# Patient Record
Sex: FEMALE | Race: BLACK OR AFRICAN AMERICAN | NOT HISPANIC OR LATINO | URBAN - METROPOLITAN AREA
[De-identification: names, ages, dates, MRNs, and addresses within clinical notes are randomized per-mention and may not be internally consistent; named-entity substitution may affect disease eponyms.]

---

## 2019-09-23 ENCOUNTER — INPATIENT (INPATIENT)
Facility: HOSPITAL | Age: 69
LOS: 16 days | Discharge: NURSING, REHAB OR LONG-TERM CARE | End: 2019-10-10
Attending: HOSPITALIST | Admitting: HOSPITALIST
Payer: MEDICARE

## 2019-09-23 VITALS
HEIGHT: 64 IN | TEMPERATURE: 98 F | SYSTOLIC BLOOD PRESSURE: 125 MMHG | RESPIRATION RATE: 16 BRPM | OXYGEN SATURATION: 97 % | WEIGHT: 160.06 LBS | DIASTOLIC BLOOD PRESSURE: 62 MMHG | HEART RATE: 96 BPM

## 2019-09-23 LAB
ALBUMIN SERPL ELPH-MCNC: 3.4 G/DL — SIGNIFICANT CHANGE UP (ref 3.3–5)
ALP SERPL-CCNC: 104 U/L — SIGNIFICANT CHANGE UP (ref 40–120)
ALT FLD-CCNC: 43 U/L — SIGNIFICANT CHANGE UP (ref 12–78)
ANION GAP SERPL CALC-SCNC: 10 MMOL/L — SIGNIFICANT CHANGE UP (ref 5–17)
ANISOCYTOSIS BLD QL: SLIGHT — SIGNIFICANT CHANGE UP
APTT BLD: 42.3 SEC — HIGH (ref 27.5–36.3)
AST SERPL-CCNC: 33 U/L — SIGNIFICANT CHANGE UP (ref 15–37)
BASOPHILS # BLD AUTO: 0.02 K/UL — SIGNIFICANT CHANGE UP (ref 0–0.2)
BASOPHILS NFR BLD AUTO: 0.1 % — SIGNIFICANT CHANGE UP (ref 0–2)
BILIRUB SERPL-MCNC: 0.4 MG/DL — SIGNIFICANT CHANGE UP (ref 0.2–1.2)
BLD GP AB SCN SERPL QL: SIGNIFICANT CHANGE UP
BUN SERPL-MCNC: 12 MG/DL — SIGNIFICANT CHANGE UP (ref 7–23)
CALCIUM SERPL-MCNC: 9.6 MG/DL — SIGNIFICANT CHANGE UP (ref 8.5–10.1)
CHLORIDE SERPL-SCNC: 98 MMOL/L — SIGNIFICANT CHANGE UP (ref 96–108)
CO2 SERPL-SCNC: 29 MMOL/L — SIGNIFICANT CHANGE UP (ref 22–31)
CREAT SERPL-MCNC: 0.84 MG/DL — SIGNIFICANT CHANGE UP (ref 0.5–1.3)
EOSINOPHIL # BLD AUTO: 0 K/UL — SIGNIFICANT CHANGE UP (ref 0–0.5)
EOSINOPHIL NFR BLD AUTO: 0 % — SIGNIFICANT CHANGE UP (ref 0–6)
GLUCOSE SERPL-MCNC: 133 MG/DL — HIGH (ref 70–99)
HCT VFR BLD CALC: 32.7 % — LOW (ref 34.5–45)
HGB BLD-MCNC: 9.7 G/DL — LOW (ref 11.5–15.5)
HYPOCHROMIA BLD QL: SLIGHT — SIGNIFICANT CHANGE UP
IMM GRANULOCYTES NFR BLD AUTO: 0.5 % — SIGNIFICANT CHANGE UP (ref 0–1.5)
INR BLD: 2.94 RATIO — HIGH (ref 0.88–1.16)
LYMPHOCYTES # BLD AUTO: 0.73 K/UL — LOW (ref 1–3.3)
LYMPHOCYTES # BLD AUTO: 5.2 % — LOW (ref 13–44)
MACROCYTES BLD QL: SLIGHT — SIGNIFICANT CHANGE UP
MANUAL SMEAR VERIFICATION: SIGNIFICANT CHANGE UP
MCHC RBC-ENTMCNC: 22.1 PG — LOW (ref 27–34)
MCHC RBC-ENTMCNC: 29.7 GM/DL — LOW (ref 32–36)
MCV RBC AUTO: 74.7 FL — LOW (ref 80–100)
MICROCYTES BLD QL: SLIGHT — SIGNIFICANT CHANGE UP
MONOCYTES # BLD AUTO: 0.67 K/UL — SIGNIFICANT CHANGE UP (ref 0–0.9)
MONOCYTES NFR BLD AUTO: 4.8 % — SIGNIFICANT CHANGE UP (ref 2–14)
NEUTROPHILS # BLD AUTO: 12.52 K/UL — HIGH (ref 1.8–7.4)
NEUTROPHILS NFR BLD AUTO: 89.4 % — HIGH (ref 43–77)
NRBC # BLD: 0 /100 WBCS — SIGNIFICANT CHANGE UP (ref 0–0)
NT-PROBNP SERPL-SCNC: 97 PG/ML — SIGNIFICANT CHANGE UP (ref 0–125)
OVALOCYTES BLD QL SMEAR: SIGNIFICANT CHANGE UP
PLAT MORPH BLD: NORMAL — SIGNIFICANT CHANGE UP
PLATELET # BLD AUTO: 293 K/UL — SIGNIFICANT CHANGE UP (ref 150–400)
POIKILOCYTOSIS BLD QL AUTO: SIGNIFICANT CHANGE UP
POTASSIUM SERPL-MCNC: 3.1 MMOL/L — LOW (ref 3.5–5.3)
POTASSIUM SERPL-SCNC: 3.1 MMOL/L — LOW (ref 3.5–5.3)
PROT SERPL-MCNC: 8.4 GM/DL — HIGH (ref 6–8.3)
PROTHROM AB SERPL-ACNC: 34.1 SEC — HIGH (ref 10–12.9)
RBC # BLD: 4.38 M/UL — SIGNIFICANT CHANGE UP (ref 3.8–5.2)
RBC # FLD: 18.5 % — HIGH (ref 10.3–14.5)
RBC BLD AUTO: SIGNIFICANT CHANGE UP
SODIUM SERPL-SCNC: 137 MMOL/L — SIGNIFICANT CHANGE UP (ref 135–145)
WBC # BLD: 14.01 K/UL — HIGH (ref 3.8–10.5)
WBC # FLD AUTO: 14.01 K/UL — HIGH (ref 3.8–10.5)

## 2019-09-23 PROCEDURE — 93970 EXTREMITY STUDY: CPT | Mod: 26

## 2019-09-23 PROCEDURE — 99285 EMERGENCY DEPT VISIT HI MDM: CPT

## 2019-09-23 PROCEDURE — 71045 X-RAY EXAM CHEST 1 VIEW: CPT | Mod: 26

## 2019-09-23 RX ORDER — WARFARIN SODIUM 2.5 MG/1
1 TABLET ORAL
Qty: 0 | Refills: 0 | DISCHARGE

## 2019-09-23 RX ORDER — ATORVASTATIN CALCIUM 80 MG/1
1 TABLET, FILM COATED ORAL
Qty: 0 | Refills: 0 | DISCHARGE

## 2019-09-23 NOTE — ED PROVIDER NOTE - OBJECTIVE STATEMENT
68 years old female by ems with daughter c/o bilateral legs swelling and pain L>R for last couple of days. Pt has a hx of cva with left side weakness and pt is bed bound. Pt is taking coumadin and dose had been increased two days ago. Pt denies recent hx of trauma, headache, dizziness, blurred visions, light sensitivities, focal/distal weakness or numbness, cough, sob, chest pain, nausea, vomiting, fever, chills, abd pain, dysuria or irregular bowel movements.

## 2019-09-23 NOTE — ED PROVIDER NOTE - NEURO NEGATIVE STATEMENT, MLM
no loss of consciousness,, no headache, no sensory deficits, and no new weakness or no new motor deficits.

## 2019-09-23 NOTE — ED ADULT NURSE NOTE - NSIMPLEMENTINTERV_GEN_ALL_ED
Implemented All Fall with Harm Risk Interventions:  [place] to call system. Call bell, personal items and telephone within reach. Instruct patient to call for assistance. Room bathroom lighting operational. Non-slip footwear when patient is off stretcher. Physically safe environment: no spills, clutter or unnecessary equipment. Stretcher in lowest position, wheels locked, appropriate side rails in place. Provide visual cue, wrist band, yellow gown, etc. Monitor gait and stability. Monitor for mental status changes and reorient to person, place, and time. Review medications for side effects contributing to fall risk. Reinforce activity limits and safety measures with patient and family. Provide visual clues: red socks.

## 2019-09-23 NOTE — ED ADULT TRIAGE NOTE - CHIEF COMPLAINT QUOTE
left leg pain for a few days  wheel chair bound  s/p CVA on Coumadin dose level low increased 2 days ago. Pain and edema to leg increased and called PCP instructed come to hospital to r/o DVT

## 2019-09-24 DIAGNOSIS — I10 ESSENTIAL (PRIMARY) HYPERTENSION: ICD-10-CM

## 2019-09-24 DIAGNOSIS — G81.94 HEMIPLEGIA, UNSPECIFIED AFFECTING LEFT NONDOMINANT SIDE: ICD-10-CM

## 2019-09-24 DIAGNOSIS — M79.605 PAIN IN LEFT LEG: ICD-10-CM

## 2019-09-24 LAB
ANION GAP SERPL CALC-SCNC: 8 MMOL/L — SIGNIFICANT CHANGE UP (ref 5–17)
APPEARANCE UR: CLEAR — SIGNIFICANT CHANGE UP
BACTERIA # UR AUTO: NEGATIVE — SIGNIFICANT CHANGE UP
BASOPHILS # BLD AUTO: 0.02 K/UL — SIGNIFICANT CHANGE UP (ref 0–0.2)
BASOPHILS NFR BLD AUTO: 0.2 % — SIGNIFICANT CHANGE UP (ref 0–2)
BILIRUB UR-MCNC: NEGATIVE — SIGNIFICANT CHANGE UP
BUN SERPL-MCNC: 13 MG/DL — SIGNIFICANT CHANGE UP (ref 7–23)
CALCIUM SERPL-MCNC: 9.8 MG/DL — SIGNIFICANT CHANGE UP (ref 8.5–10.1)
CHLORIDE SERPL-SCNC: 100 MMOL/L — SIGNIFICANT CHANGE UP (ref 96–108)
CK SERPL-CCNC: 2736 U/L — HIGH (ref 26–192)
CO2 SERPL-SCNC: 30 MMOL/L — SIGNIFICANT CHANGE UP (ref 22–31)
COLOR SPEC: YELLOW — SIGNIFICANT CHANGE UP
CREAT SERPL-MCNC: 0.79 MG/DL — SIGNIFICANT CHANGE UP (ref 0.5–1.3)
DIFF PNL FLD: NEGATIVE — SIGNIFICANT CHANGE UP
EOSINOPHIL # BLD AUTO: 0 K/UL — SIGNIFICANT CHANGE UP (ref 0–0.5)
EOSINOPHIL NFR BLD AUTO: 0 % — SIGNIFICANT CHANGE UP (ref 0–6)
EPI CELLS # UR: SIGNIFICANT CHANGE UP
GLUCOSE SERPL-MCNC: 132 MG/DL — HIGH (ref 70–99)
GLUCOSE UR QL: NEGATIVE MG/DL — SIGNIFICANT CHANGE UP
HCT VFR BLD CALC: 32.4 % — LOW (ref 34.5–45)
HGB BLD-MCNC: 9.6 G/DL — LOW (ref 11.5–15.5)
IMM GRANULOCYTES NFR BLD AUTO: 0.5 % — SIGNIFICANT CHANGE UP (ref 0–1.5)
INR BLD: 2.34 RATIO — HIGH (ref 0.88–1.16)
KETONES UR-MCNC: ABNORMAL
LACTATE SERPL-SCNC: 1.6 MMOL/L — SIGNIFICANT CHANGE UP (ref 0.7–2)
LEUKOCYTE ESTERASE UR-ACNC: NEGATIVE — SIGNIFICANT CHANGE UP
LYMPHOCYTES # BLD AUTO: 1.27 K/UL — SIGNIFICANT CHANGE UP (ref 1–3.3)
LYMPHOCYTES # BLD AUTO: 12.6 % — LOW (ref 13–44)
MCHC RBC-ENTMCNC: 21.9 PG — LOW (ref 27–34)
MCHC RBC-ENTMCNC: 29.6 GM/DL — LOW (ref 32–36)
MCV RBC AUTO: 74 FL — LOW (ref 80–100)
MONOCYTES # BLD AUTO: 0.79 K/UL — SIGNIFICANT CHANGE UP (ref 0–0.9)
MONOCYTES NFR BLD AUTO: 7.8 % — SIGNIFICANT CHANGE UP (ref 2–14)
NEUTROPHILS # BLD AUTO: 7.96 K/UL — HIGH (ref 1.8–7.4)
NEUTROPHILS NFR BLD AUTO: 78.9 % — HIGH (ref 43–77)
NITRITE UR-MCNC: NEGATIVE — SIGNIFICANT CHANGE UP
NRBC # BLD: 0 /100 WBCS — SIGNIFICANT CHANGE UP (ref 0–0)
PH UR: 7 — SIGNIFICANT CHANGE UP (ref 5–8)
PLATELET # BLD AUTO: 300 K/UL — SIGNIFICANT CHANGE UP (ref 150–400)
POTASSIUM SERPL-MCNC: 3.5 MMOL/L — SIGNIFICANT CHANGE UP (ref 3.5–5.3)
POTASSIUM SERPL-SCNC: 3.5 MMOL/L — SIGNIFICANT CHANGE UP (ref 3.5–5.3)
PROT UR-MCNC: NEGATIVE MG/DL — SIGNIFICANT CHANGE UP
PROTHROM AB SERPL-ACNC: 26.9 SEC — HIGH (ref 10–12.9)
RBC # BLD: 4.38 M/UL — SIGNIFICANT CHANGE UP (ref 3.8–5.2)
RBC # FLD: 18.3 % — HIGH (ref 10.3–14.5)
SODIUM SERPL-SCNC: 138 MMOL/L — SIGNIFICANT CHANGE UP (ref 135–145)
SP GR SPEC: 1.01 — SIGNIFICANT CHANGE UP (ref 1.01–1.02)
UROBILINOGEN FLD QL: NEGATIVE MG/DL — SIGNIFICANT CHANGE UP
WBC # BLD: 10.09 K/UL — SIGNIFICANT CHANGE UP (ref 3.8–10.5)
WBC # FLD AUTO: 10.09 K/UL — SIGNIFICANT CHANGE UP (ref 3.8–10.5)
WBC UR QL: SIGNIFICANT CHANGE UP

## 2019-09-24 PROCEDURE — 73701 CT LOWER EXTREMITY W/DYE: CPT | Mod: 26,LT

## 2019-09-24 PROCEDURE — 99232 SBSQ HOSP IP/OBS MODERATE 35: CPT

## 2019-09-24 PROCEDURE — 99223 1ST HOSP IP/OBS HIGH 75: CPT

## 2019-09-24 RX ORDER — ATORVASTATIN CALCIUM 80 MG/1
20 TABLET, FILM COATED ORAL AT BEDTIME
Refills: 0 | Status: DISCONTINUED | OUTPATIENT
Start: 2019-09-24 | End: 2019-10-08

## 2019-09-24 RX ORDER — WARFARIN SODIUM 2.5 MG/1
3 TABLET ORAL ONCE
Refills: 0 | Status: DISCONTINUED | OUTPATIENT
Start: 2019-09-24 | End: 2019-09-24

## 2019-09-24 RX ORDER — ACETAMINOPHEN 500 MG
650 TABLET ORAL EVERY 6 HOURS
Refills: 0 | Status: DISCONTINUED | OUTPATIENT
Start: 2019-09-24 | End: 2019-10-08

## 2019-09-24 RX ORDER — NYSTATIN CREAM 100000 [USP'U]/G
1 CREAM TOPICAL
Refills: 0 | Status: DISCONTINUED | OUTPATIENT
Start: 2019-09-24 | End: 2019-10-08

## 2019-09-24 RX ORDER — SODIUM CHLORIDE 9 MG/ML
1000 INJECTION INTRAMUSCULAR; INTRAVENOUS; SUBCUTANEOUS
Refills: 0 | Status: DISCONTINUED | OUTPATIENT
Start: 2019-09-24 | End: 2019-10-07

## 2019-09-24 RX ORDER — WARFARIN SODIUM 2.5 MG/1
2 TABLET ORAL ONCE
Refills: 0 | Status: COMPLETED | OUTPATIENT
Start: 2019-09-24 | End: 2019-09-24

## 2019-09-24 RX ORDER — GABAPENTIN 400 MG/1
100 CAPSULE ORAL
Refills: 0 | Status: DISCONTINUED | OUTPATIENT
Start: 2019-09-24 | End: 2019-10-08

## 2019-09-24 RX ORDER — LISINOPRIL 2.5 MG/1
10 TABLET ORAL DAILY
Refills: 0 | Status: DISCONTINUED | OUTPATIENT
Start: 2019-09-24 | End: 2019-10-08

## 2019-09-24 RX ORDER — POTASSIUM CHLORIDE 20 MEQ
40 PACKET (EA) ORAL ONCE
Refills: 0 | Status: COMPLETED | OUTPATIENT
Start: 2019-09-24 | End: 2019-09-24

## 2019-09-24 RX ORDER — INFLUENZA VIRUS VACCINE 15; 15; 15; 15 UG/.5ML; UG/.5ML; UG/.5ML; UG/.5ML
0.5 SUSPENSION INTRAMUSCULAR ONCE
Refills: 0 | Status: DISCONTINUED | OUTPATIENT
Start: 2019-09-24 | End: 2019-10-10

## 2019-09-24 RX ADMIN — SODIUM CHLORIDE 85 MILLILITER(S): 9 INJECTION INTRAMUSCULAR; INTRAVENOUS; SUBCUTANEOUS at 21:17

## 2019-09-24 RX ADMIN — GABAPENTIN 100 MILLIGRAM(S): 400 CAPSULE ORAL at 05:29

## 2019-09-24 RX ADMIN — Medication 40 MILLIEQUIVALENT(S): at 04:07

## 2019-09-24 RX ADMIN — NYSTATIN CREAM 1 APPLICATION(S): 100000 CREAM TOPICAL at 17:58

## 2019-09-24 RX ADMIN — LISINOPRIL 10 MILLIGRAM(S): 2.5 TABLET ORAL at 05:29

## 2019-09-24 RX ADMIN — ATORVASTATIN CALCIUM 20 MILLIGRAM(S): 80 TABLET, FILM COATED ORAL at 21:17

## 2019-09-24 RX ADMIN — WARFARIN SODIUM 2 MILLIGRAM(S): 2.5 TABLET ORAL at 21:17

## 2019-09-24 RX ADMIN — GABAPENTIN 100 MILLIGRAM(S): 400 CAPSULE ORAL at 17:58

## 2019-09-24 RX ADMIN — SODIUM CHLORIDE 85 MILLILITER(S): 9 INJECTION INTRAMUSCULAR; INTRAVENOUS; SUBCUTANEOUS at 18:15

## 2019-09-24 NOTE — H&P ADULT - NSICDXFAMILYHX_GEN_ALL_CORE_FT
FAMILY HISTORY:  Father  Still living? No  Family history of acute myocardial infarction, Age at diagnosis: 71-80    Mother  Still living? No  Family history of stroke, Age at diagnosis: 61-70

## 2019-09-24 NOTE — H&P ADULT - ASSESSMENT
68 year old female PMH CVA w left hemiparesis, HTN, HL brought by ems with daughter c/o bilateral legs swelling and pain L>R for last couple of days. Pt has a hx of CVA with left side weakness and pt is bed bound. Pt is taking coumadin and dose had been increased two days ago. Pt denies recent hx of trauma, headache, dizziness, blurred visions, light sensitivity, focal/distal weakness or numbness, cough, sob, chest pain, nausea, vomiting, fever, chills, abd pain, dysuria or irregular bowel movements. Pt anticoagulated on coumadin, ultrasound shows no DVT.  IMPROVE VTE Individual Risk Assessment          RISK                                                          Points    [  ] Previous VTE                                                3    [  ] Thrombophilia                                             2    [x  ] Lower limb paralysis                                    2        (unable to hold up >15 seconds)      [  ] Current Cancer                                             2         (within 6 months)    [ x ] Immobilization > 24 hrs                              1    [  ] ICU/CCU stay > 24 hours                            1    [ x ] Age > 60                                                    1    IMPROVE VTE Score ___4______

## 2019-09-24 NOTE — H&P ADULT - NSHPLABSRESULTS_GEN_ALL_CORE
LABS:                        9.7    14.01 )-----------( 293      ( 23 Sep 2019 17:38 )             32.7     09-23    137  |  98  |  12  ----------------------------<  133<H>  3.1<L>   |  29  |  0.84    Ca    9.6      23 Sep 2019 17:38    TPro  8.4<H>  /  Alb  3.4  /  TBili  0.4  /  DBili  x   /  AST  33  /  ALT  43  /  AlkPhos  104  09-23    PT/INR - ( 23 Sep 2019 17:38 )   PT: 34.1 sec;   INR: 2.94 ratio         PTT - ( 23 Sep 2019 17:38 )  PTT:42.3 sec    CAPILLARY BLOOD GLUCOSE  Serum Pro-Brain Natriuretic Peptide: 97 pg/mL (09.23.19 @ 17:38)            RADIOLOGY & ADDITIONAL TESTS:  < from: Xray Chest 1 View- PORTABLE-Urgent (09.23.19 @ 20:34) >    IMPRESSION:    Cardiomegaly. Clear lungs.    < end of copied text >  < from: US Duplex Venous Lower Ext Complete, Bilateral (09.23.19 @ 19:21) >    IMPRESSION:     No evidence of deep vein thrombosis in the visualized venous segments of   either  lower extremity.     < end of copied text >        Imaging Personally Reviewed:  [ x] YES  [ ] NO

## 2019-09-24 NOTE — H&P ADULT - NSHPPHYSICALEXAM_GEN_ALL_CORE
T(C): 36.9 (24 Sep 2019 00:14), Max: 36.9 (24 Sep 2019 00:14)  T(F): 98.4 (24 Sep 2019 00:14), Max: 98.4 (24 Sep 2019 00:14)  HR: 113 (24 Sep 2019 00:14) (96 - 113)  BP: 147/80 (24 Sep 2019 00:14) (125/62 - 147/80)  BP(mean): --  RR: 18 (24 Sep 2019 00:14) (16 - 18)  SpO2: 95% (24 Sep 2019 00:14) (95% - 97%)    PHYSICAL EXAM:  GENERAL: NAD, well-groomed, well-developed  HEAD:  Atraumatic, Normocephalic  EYES: EOMI, PERRLA, conjunctiva and sclera clear  ENMT: No tonsillar erythema, exudates, or enlargement; Moist mucous membranes, No lesions  NECK: Supple, No JVD, Normal thyroid  NERVOUS SYSTEM:  Alert & Oriented X3, L hemiparesis  CHEST/LUNG: Clear to percussion bilaterally; No rales, rhonchi, wheezing, or rubs  HEART: Regular rate and rhythm; No murmurs, rubs, or gallops  ABDOMEN: Soft, Nontender, Nondistended; Bowel sounds present  EXTREMITIES:  + Peripheral Pulses, No clubbing, cyanosis, + edema  LYMPH: No lymphadenopathy noted  SKIN: No rashes or lesions

## 2019-09-24 NOTE — PROGRESS NOTE ADULT - PROBLEM SELECTOR PLAN 1
patient denies trauma  pain control, no DVT on US   concern for possible compartment syndrome vs bleed?  follow CT of leg C+  vascular consult

## 2019-09-24 NOTE — H&P ADULT - HISTORY OF PRESENT ILLNESS
68 year old female PMH CVA w left hemiparesis, HTN, HL brought by ems with daughter c/o bilateral legs swelling and pain L>R for last couple of days. Pt has a hx of CVA with left side weakness and pt is bed bound. Pt is taking coumadin and dose had been increased two days ago. Pt denies recent hx of trauma, headache, dizziness, blurred visions, light sensitivity, focal/distal weakness or numbness, cough, sob, chest pain, nausea, vomiting, fever, chills, abd pain, dysuria or irregular bowel movements.

## 2019-09-24 NOTE — H&P ADULT - NSICDXPASTMEDICALHX_GEN_ALL_CORE_FT
PAST MEDICAL HISTORY:  CVA (cerebral infarction)     Depression     Hypercholesteremia     Hypertension     Left hemiparesis

## 2019-09-25 DIAGNOSIS — Z29.9 ENCOUNTER FOR PROPHYLACTIC MEASURES, UNSPECIFIED: ICD-10-CM

## 2019-09-25 DIAGNOSIS — R53.2 FUNCTIONAL QUADRIPLEGIA: ICD-10-CM

## 2019-09-25 DIAGNOSIS — M62.82 RHABDOMYOLYSIS: ICD-10-CM

## 2019-09-25 LAB
ANION GAP SERPL CALC-SCNC: 6 MMOL/L — SIGNIFICANT CHANGE UP (ref 5–17)
ANION GAP SERPL CALC-SCNC: 7 MMOL/L — SIGNIFICANT CHANGE UP (ref 5–17)
BUN SERPL-MCNC: 11 MG/DL — SIGNIFICANT CHANGE UP (ref 7–23)
BUN SERPL-MCNC: 12 MG/DL — SIGNIFICANT CHANGE UP (ref 7–23)
CALCIUM SERPL-MCNC: 9.2 MG/DL — SIGNIFICANT CHANGE UP (ref 8.5–10.1)
CALCIUM SERPL-MCNC: 9.4 MG/DL — SIGNIFICANT CHANGE UP (ref 8.5–10.1)
CHLORIDE SERPL-SCNC: 104 MMOL/L — SIGNIFICANT CHANGE UP (ref 96–108)
CHLORIDE SERPL-SCNC: 108 MMOL/L — SIGNIFICANT CHANGE UP (ref 96–108)
CK SERPL-CCNC: 2536 U/L — HIGH (ref 26–192)
CO2 SERPL-SCNC: 27 MMOL/L — SIGNIFICANT CHANGE UP (ref 22–31)
CO2 SERPL-SCNC: 29 MMOL/L — SIGNIFICANT CHANGE UP (ref 22–31)
CREAT SERPL-MCNC: 0.65 MG/DL — SIGNIFICANT CHANGE UP (ref 0.5–1.3)
CREAT SERPL-MCNC: 0.66 MG/DL — SIGNIFICANT CHANGE UP (ref 0.5–1.3)
CULTURE RESULTS: NO GROWTH — SIGNIFICANT CHANGE UP
GLUCOSE SERPL-MCNC: 101 MG/DL — HIGH (ref 70–99)
GLUCOSE SERPL-MCNC: 108 MG/DL — HIGH (ref 70–99)
HCT VFR BLD CALC: 29.5 % — LOW (ref 34.5–45)
HCV AB S/CO SERPL IA: 0.11 S/CO — SIGNIFICANT CHANGE UP (ref 0–0.99)
HCV AB SERPL-IMP: SIGNIFICANT CHANGE UP
HGB BLD-MCNC: 8.6 G/DL — LOW (ref 11.5–15.5)
INR BLD: 2.11 RATIO — HIGH (ref 0.88–1.16)
MAGNESIUM SERPL-MCNC: 2 MG/DL — SIGNIFICANT CHANGE UP (ref 1.6–2.6)
MCHC RBC-ENTMCNC: 21.7 PG — LOW (ref 27–34)
MCHC RBC-ENTMCNC: 29.2 GM/DL — LOW (ref 32–36)
MCV RBC AUTO: 74.5 FL — LOW (ref 80–100)
NRBC # BLD: 0 /100 WBCS — SIGNIFICANT CHANGE UP (ref 0–0)
PHOSPHATE SERPL-MCNC: 1.9 MG/DL — LOW (ref 2.5–4.5)
PHOSPHATE SERPL-MCNC: 2.5 MG/DL — SIGNIFICANT CHANGE UP (ref 2.5–4.5)
PLATELET # BLD AUTO: 249 K/UL — SIGNIFICANT CHANGE UP (ref 150–400)
POTASSIUM SERPL-MCNC: 2.9 MMOL/L — CRITICAL LOW (ref 3.5–5.3)
POTASSIUM SERPL-MCNC: 4 MMOL/L — SIGNIFICANT CHANGE UP (ref 3.5–5.3)
POTASSIUM SERPL-SCNC: 2.9 MMOL/L — CRITICAL LOW (ref 3.5–5.3)
POTASSIUM SERPL-SCNC: 4 MMOL/L — SIGNIFICANT CHANGE UP (ref 3.5–5.3)
PROTHROM AB SERPL-ACNC: 24.2 SEC — HIGH (ref 10–12.9)
RBC # BLD: 3.96 M/UL — SIGNIFICANT CHANGE UP (ref 3.8–5.2)
RBC # FLD: 18.4 % — HIGH (ref 10.3–14.5)
SODIUM SERPL-SCNC: 139 MMOL/L — SIGNIFICANT CHANGE UP (ref 135–145)
SODIUM SERPL-SCNC: 142 MMOL/L — SIGNIFICANT CHANGE UP (ref 135–145)
SPECIMEN SOURCE: SIGNIFICANT CHANGE UP
WBC # BLD: 8.68 K/UL — SIGNIFICANT CHANGE UP (ref 3.8–10.5)
WBC # FLD AUTO: 8.68 K/UL — SIGNIFICANT CHANGE UP (ref 3.8–10.5)

## 2019-09-25 PROCEDURE — 99232 SBSQ HOSP IP/OBS MODERATE 35: CPT

## 2019-09-25 PROCEDURE — 71260 CT THORAX DX C+: CPT | Mod: 26

## 2019-09-25 PROCEDURE — 74177 CT ABD & PELVIS W/CONTRAST: CPT | Mod: 26

## 2019-09-25 RX ORDER — POTASSIUM CHLORIDE 20 MEQ
40 PACKET (EA) ORAL EVERY 4 HOURS
Refills: 0 | Status: COMPLETED | OUTPATIENT
Start: 2019-09-25 | End: 2019-09-25

## 2019-09-25 RX ORDER — POTASSIUM PHOSPHATE, MONOBASIC POTASSIUM PHOSPHATE, DIBASIC 236; 224 MG/ML; MG/ML
15 INJECTION, SOLUTION INTRAVENOUS ONCE
Refills: 0 | Status: COMPLETED | OUTPATIENT
Start: 2019-09-25 | End: 2019-09-25

## 2019-09-25 RX ADMIN — SODIUM CHLORIDE 85 MILLILITER(S): 9 INJECTION INTRAMUSCULAR; INTRAVENOUS; SUBCUTANEOUS at 05:06

## 2019-09-25 RX ADMIN — Medication 40 MILLIEQUIVALENT(S): at 21:12

## 2019-09-25 RX ADMIN — GABAPENTIN 100 MILLIGRAM(S): 400 CAPSULE ORAL at 05:02

## 2019-09-25 RX ADMIN — POTASSIUM PHOSPHATE, MONOBASIC POTASSIUM PHOSPHATE, DIBASIC 62.5 MILLIMOLE(S): 236; 224 INJECTION, SOLUTION INTRAVENOUS at 12:27

## 2019-09-25 RX ADMIN — LISINOPRIL 10 MILLIGRAM(S): 2.5 TABLET ORAL at 05:02

## 2019-09-25 RX ADMIN — NYSTATIN CREAM 1 APPLICATION(S): 100000 CREAM TOPICAL at 05:02

## 2019-09-25 RX ADMIN — SODIUM CHLORIDE 85 MILLILITER(S): 9 INJECTION INTRAMUSCULAR; INTRAVENOUS; SUBCUTANEOUS at 21:12

## 2019-09-25 RX ADMIN — Medication 40 MILLIEQUIVALENT(S): at 17:15

## 2019-09-25 RX ADMIN — Medication 40 MILLIEQUIVALENT(S): at 12:27

## 2019-09-25 RX ADMIN — Medication 650 MILLIGRAM(S): at 17:15

## 2019-09-25 RX ADMIN — Medication 650 MILLIGRAM(S): at 18:15

## 2019-09-25 RX ADMIN — ATORVASTATIN CALCIUM 20 MILLIGRAM(S): 80 TABLET, FILM COATED ORAL at 21:12

## 2019-09-25 RX ADMIN — GABAPENTIN 100 MILLIGRAM(S): 400 CAPSULE ORAL at 17:15

## 2019-09-25 RX ADMIN — NYSTATIN CREAM 1 APPLICATION(S): 100000 CREAM TOPICAL at 17:15

## 2019-09-25 NOTE — PROGRESS NOTE ADULT - PROBLEM SELECTOR PLAN 3
H/O recurrent CVA with left sided residual deficits   s/p  shunt and on coumadin   follow INR, coumadin at bedtime based on INR   PT presumed  denies vaginal bleeding   follow CTAP  depending on findings may need GYN consult

## 2019-09-25 NOTE — PROGRESS NOTE ADULT - PROBLEM SELECTOR PLAN 1
patient denies trauma  pain control, no DVT on US    CT of leg C+ - diffuse edema w/o clot or bleed. Uterine mass?  vascular consult appreciated   follow CTAP C+ , depending on finding will need GYN consult

## 2019-09-25 NOTE — PROGRESS NOTE ADULT - PROBLEM SELECTOR PLAN 4
continue with meds H/O recurrent CVA with left sided residual deficits   s/p  shunt and on coumadin   follow INR, coumadin at bedtime based on INR   PT

## 2019-09-25 NOTE — PROGRESS NOTE ADULT - PROBLEM SELECTOR PLAN 7
coumadin -DVT ppx  fall and aspiration precautions Hypokalemia, Hypophosphatemia -replete and monitor

## 2019-09-25 NOTE — PROGRESS NOTE ADULT - PROBLEM SELECTOR PLAN 6
coumadin -DVT ppx  fall and aspiration precautions Hypokalemia, Hypophosphatemia -replete and monitor supportive care  patient wheel chair bound

## 2019-09-26 DIAGNOSIS — E87.8 OTHER DISORDERS OF ELECTROLYTE AND FLUID BALANCE, NOT ELSEWHERE CLASSIFIED: ICD-10-CM

## 2019-09-26 DIAGNOSIS — D50.8 OTHER IRON DEFICIENCY ANEMIAS: ICD-10-CM

## 2019-09-26 DIAGNOSIS — N85.8 OTHER SPECIFIED NONINFLAMMATORY DISORDERS OF UTERUS: ICD-10-CM

## 2019-09-26 LAB
ANION GAP SERPL CALC-SCNC: 3 MMOL/L — LOW (ref 5–17)
BUN SERPL-MCNC: 12 MG/DL — SIGNIFICANT CHANGE UP (ref 7–23)
CALCIUM SERPL-MCNC: 9 MG/DL — SIGNIFICANT CHANGE UP (ref 8.5–10.1)
CHLORIDE SERPL-SCNC: 112 MMOL/L — HIGH (ref 96–108)
CK SERPL-CCNC: 936 U/L — HIGH (ref 26–192)
CO2 SERPL-SCNC: 28 MMOL/L — SIGNIFICANT CHANGE UP (ref 22–31)
CREAT SERPL-MCNC: 0.59 MG/DL — SIGNIFICANT CHANGE UP (ref 0.5–1.3)
FERRITIN SERPL-MCNC: 24 NG/ML — SIGNIFICANT CHANGE UP (ref 15–150)
GLUCOSE SERPL-MCNC: 96 MG/DL — SIGNIFICANT CHANGE UP (ref 70–99)
HCT VFR BLD CALC: 29.8 % — LOW (ref 34.5–45)
HGB BLD-MCNC: 8.7 G/DL — LOW (ref 11.5–15.5)
INR BLD: 1.97 RATIO — HIGH (ref 0.88–1.16)
IRON SATN MFR SERPL: 11 % — LOW (ref 14–50)
IRON SATN MFR SERPL: 26 UG/DL — LOW (ref 30–160)
MAGNESIUM SERPL-MCNC: 2 MG/DL — SIGNIFICANT CHANGE UP (ref 1.6–2.6)
MCHC RBC-ENTMCNC: 22.3 PG — LOW (ref 27–34)
MCHC RBC-ENTMCNC: 29.2 GM/DL — LOW (ref 32–36)
MCV RBC AUTO: 76.4 FL — LOW (ref 80–100)
NRBC # BLD: 0 /100 WBCS — SIGNIFICANT CHANGE UP (ref 0–0)
OB PNL STL: NEGATIVE — SIGNIFICANT CHANGE UP
PHOSPHATE SERPL-MCNC: 2 MG/DL — LOW (ref 2.5–4.5)
PLATELET # BLD AUTO: 232 K/UL — SIGNIFICANT CHANGE UP (ref 150–400)
POTASSIUM SERPL-MCNC: 4.3 MMOL/L — SIGNIFICANT CHANGE UP (ref 3.5–5.3)
POTASSIUM SERPL-SCNC: 4.3 MMOL/L — SIGNIFICANT CHANGE UP (ref 3.5–5.3)
PROTHROM AB SERPL-ACNC: 22.5 SEC — HIGH (ref 10–12.9)
RBC # BLD: 3.9 M/UL — SIGNIFICANT CHANGE UP (ref 3.8–5.2)
RBC # FLD: 18.7 % — HIGH (ref 10.3–14.5)
SODIUM SERPL-SCNC: 143 MMOL/L — SIGNIFICANT CHANGE UP (ref 135–145)
TIBC SERPL-MCNC: 242 UG/DL — SIGNIFICANT CHANGE UP (ref 220–430)
UIBC SERPL-MCNC: 216 UG/DL — SIGNIFICANT CHANGE UP (ref 110–370)
WBC # BLD: 7.36 K/UL — SIGNIFICANT CHANGE UP (ref 3.8–10.5)
WBC # FLD AUTO: 7.36 K/UL — SIGNIFICANT CHANGE UP (ref 3.8–10.5)

## 2019-09-26 PROCEDURE — 99233 SBSQ HOSP IP/OBS HIGH 50: CPT

## 2019-09-26 RX ORDER — TRAMADOL HYDROCHLORIDE 50 MG/1
50 TABLET ORAL EVERY 6 HOURS
Refills: 0 | Status: DISCONTINUED | OUTPATIENT
Start: 2019-09-26 | End: 2019-10-03

## 2019-09-26 RX ORDER — TRAMADOL HYDROCHLORIDE 50 MG/1
50 TABLET ORAL EVERY 6 HOURS
Refills: 0 | Status: DISCONTINUED | OUTPATIENT
Start: 2019-09-26 | End: 2019-09-26

## 2019-09-26 RX ORDER — WARFARIN SODIUM 2.5 MG/1
3 TABLET ORAL ONCE
Refills: 0 | Status: COMPLETED | OUTPATIENT
Start: 2019-09-26 | End: 2019-09-26

## 2019-09-26 RX ADMIN — NYSTATIN CREAM 1 APPLICATION(S): 100000 CREAM TOPICAL at 17:07

## 2019-09-26 RX ADMIN — TRAMADOL HYDROCHLORIDE 50 MILLIGRAM(S): 50 TABLET ORAL at 18:18

## 2019-09-26 RX ADMIN — ATORVASTATIN CALCIUM 20 MILLIGRAM(S): 80 TABLET, FILM COATED ORAL at 22:20

## 2019-09-26 RX ADMIN — GABAPENTIN 100 MILLIGRAM(S): 400 CAPSULE ORAL at 17:06

## 2019-09-26 RX ADMIN — Medication 62.5 MILLIMOLE(S): at 09:30

## 2019-09-26 RX ADMIN — NYSTATIN CREAM 1 APPLICATION(S): 100000 CREAM TOPICAL at 06:13

## 2019-09-26 RX ADMIN — GABAPENTIN 100 MILLIGRAM(S): 400 CAPSULE ORAL at 06:12

## 2019-09-26 RX ADMIN — TRAMADOL HYDROCHLORIDE 50 MILLIGRAM(S): 50 TABLET ORAL at 17:09

## 2019-09-26 RX ADMIN — SODIUM CHLORIDE 85 MILLILITER(S): 9 INJECTION INTRAMUSCULAR; INTRAVENOUS; SUBCUTANEOUS at 11:16

## 2019-09-26 RX ADMIN — WARFARIN SODIUM 3 MILLIGRAM(S): 2.5 TABLET ORAL at 22:19

## 2019-09-26 RX ADMIN — LISINOPRIL 10 MILLIGRAM(S): 2.5 TABLET ORAL at 06:12

## 2019-09-26 NOTE — PROGRESS NOTE ADULT - PROBLEM SELECTOR PLAN 1
patient denies trauma  pain control, no DVT on US    CT of leg C+ - diffuse edema w/o clot or bleed. Uterine mass?  vascular consult appreciated   - CT abd/pelvis: right adnexal mass, right ovarian dermoid.  - Oncology eval  - GYN eval

## 2019-09-26 NOTE — PROGRESS NOTE ADULT - PROBLEM SELECTOR PLAN 4
H/O recurrent CVA with left sided residual deficits   s/p  shunt and on coumadin   follow INR, coumadin at bedtime based on INR   Coumadin 3 mg tonight.

## 2019-09-27 DIAGNOSIS — D64.9 ANEMIA, UNSPECIFIED: ICD-10-CM

## 2019-09-27 LAB
ANION GAP SERPL CALC-SCNC: 4 MMOL/L — LOW (ref 5–17)
BUN SERPL-MCNC: 13 MG/DL — SIGNIFICANT CHANGE UP (ref 7–23)
CALCIUM SERPL-MCNC: 9.1 MG/DL — SIGNIFICANT CHANGE UP (ref 8.5–10.1)
CHLORIDE SERPL-SCNC: 111 MMOL/L — HIGH (ref 96–108)
CK SERPL-CCNC: 486 U/L — HIGH (ref 26–192)
CO2 SERPL-SCNC: 27 MMOL/L — SIGNIFICANT CHANGE UP (ref 22–31)
CREAT SERPL-MCNC: 0.64 MG/DL — SIGNIFICANT CHANGE UP (ref 0.5–1.3)
GLUCOSE SERPL-MCNC: 97 MG/DL — SIGNIFICANT CHANGE UP (ref 70–99)
INR BLD: 1.58 RATIO — HIGH (ref 0.88–1.16)
POTASSIUM SERPL-MCNC: 4.4 MMOL/L — SIGNIFICANT CHANGE UP (ref 3.5–5.3)
POTASSIUM SERPL-SCNC: 4.4 MMOL/L — SIGNIFICANT CHANGE UP (ref 3.5–5.3)
PROTHROM AB SERPL-ACNC: 18 SEC — HIGH (ref 10–12.9)
SODIUM SERPL-SCNC: 142 MMOL/L — SIGNIFICANT CHANGE UP (ref 135–145)

## 2019-09-27 PROCEDURE — 99233 SBSQ HOSP IP/OBS HIGH 50: CPT

## 2019-09-27 RX ORDER — WARFARIN SODIUM 2.5 MG/1
5 TABLET ORAL ONCE
Refills: 0 | Status: COMPLETED | OUTPATIENT
Start: 2019-09-27 | End: 2019-09-27

## 2019-09-27 RX ORDER — SODIUM,POTASSIUM PHOSPHATES 278-250MG
1 POWDER IN PACKET (EA) ORAL
Refills: 0 | Status: COMPLETED | OUTPATIENT
Start: 2019-09-27 | End: 2019-09-29

## 2019-09-27 RX ADMIN — ATORVASTATIN CALCIUM 20 MILLIGRAM(S): 80 TABLET, FILM COATED ORAL at 21:22

## 2019-09-27 RX ADMIN — TRAMADOL HYDROCHLORIDE 50 MILLIGRAM(S): 50 TABLET ORAL at 11:21

## 2019-09-27 RX ADMIN — GABAPENTIN 100 MILLIGRAM(S): 400 CAPSULE ORAL at 17:01

## 2019-09-27 RX ADMIN — TRAMADOL HYDROCHLORIDE 50 MILLIGRAM(S): 50 TABLET ORAL at 22:45

## 2019-09-27 RX ADMIN — TRAMADOL HYDROCHLORIDE 50 MILLIGRAM(S): 50 TABLET ORAL at 12:21

## 2019-09-27 RX ADMIN — NYSTATIN CREAM 1 APPLICATION(S): 100000 CREAM TOPICAL at 05:26

## 2019-09-27 RX ADMIN — Medication 1 PACKET(S): at 17:01

## 2019-09-27 RX ADMIN — Medication 1 PACKET(S): at 23:12

## 2019-09-27 RX ADMIN — WARFARIN SODIUM 5 MILLIGRAM(S): 2.5 TABLET ORAL at 22:47

## 2019-09-27 RX ADMIN — LISINOPRIL 10 MILLIGRAM(S): 2.5 TABLET ORAL at 05:24

## 2019-09-27 RX ADMIN — NYSTATIN CREAM 1 APPLICATION(S): 100000 CREAM TOPICAL at 17:02

## 2019-09-27 RX ADMIN — GABAPENTIN 100 MILLIGRAM(S): 400 CAPSULE ORAL at 05:24

## 2019-09-27 RX ADMIN — TRAMADOL HYDROCHLORIDE 50 MILLIGRAM(S): 50 TABLET ORAL at 22:04

## 2019-09-27 NOTE — PROGRESS NOTE ADULT - PROBLEM SELECTOR PLAN 1
patient denies trauma  pain control, no DVT on US    CT of leg C+ - diffuse edema w/o clot or bleed. Uterine mass?  vascular consult appreciated   - CT abd/pelvis: right adnexal mass, right ovarian dermoid.  - Oncology eval called  - GYN eval called

## 2019-09-28 LAB
CANCER AG125 SERPL-ACNC: 15 U/ML — SIGNIFICANT CHANGE UP
CEA SERPL-MCNC: 1.6 NG/ML — SIGNIFICANT CHANGE UP (ref 0–3.8)
HCT VFR BLD CALC: 30.1 % — LOW (ref 34.5–45)
HGB BLD-MCNC: 8.7 G/DL — LOW (ref 11.5–15.5)
INR BLD: 1.68 RATIO — HIGH (ref 0.88–1.16)
MCHC RBC-ENTMCNC: 21.6 PG — LOW (ref 27–34)
MCHC RBC-ENTMCNC: 28.9 GM/DL — LOW (ref 32–36)
MCV RBC AUTO: 74.9 FL — LOW (ref 80–100)
NRBC # BLD: 0 /100 WBCS — SIGNIFICANT CHANGE UP (ref 0–0)
PHOSPHATE SERPL-MCNC: 3.1 MG/DL — SIGNIFICANT CHANGE UP (ref 2.5–4.5)
PLATELET # BLD AUTO: 307 K/UL — SIGNIFICANT CHANGE UP (ref 150–400)
PROTHROM AB SERPL-ACNC: 19.1 SEC — HIGH (ref 10–12.9)
RBC # BLD: 4.02 M/UL — SIGNIFICANT CHANGE UP (ref 3.8–5.2)
RBC # FLD: 19 % — HIGH (ref 10.3–14.5)
WBC # BLD: 9.52 K/UL — SIGNIFICANT CHANGE UP (ref 3.8–10.5)
WBC # FLD AUTO: 9.52 K/UL — SIGNIFICANT CHANGE UP (ref 3.8–10.5)

## 2019-09-28 PROCEDURE — 99233 SBSQ HOSP IP/OBS HIGH 50: CPT

## 2019-09-28 RX ORDER — FERROUS SULFATE 325(65) MG
325 TABLET ORAL DAILY
Refills: 0 | Status: DISCONTINUED | OUTPATIENT
Start: 2019-09-28 | End: 2019-10-08

## 2019-09-28 RX ORDER — WARFARIN SODIUM 2.5 MG/1
5 TABLET ORAL ONCE
Refills: 0 | Status: COMPLETED | OUTPATIENT
Start: 2019-09-28 | End: 2019-09-28

## 2019-09-28 RX ADMIN — ATORVASTATIN CALCIUM 20 MILLIGRAM(S): 80 TABLET, FILM COATED ORAL at 22:36

## 2019-09-28 RX ADMIN — Medication 1 PACKET(S): at 05:35

## 2019-09-28 RX ADMIN — Medication 1 PACKET(S): at 11:52

## 2019-09-28 RX ADMIN — Medication 1 PACKET(S): at 17:16

## 2019-09-28 RX ADMIN — GABAPENTIN 100 MILLIGRAM(S): 400 CAPSULE ORAL at 05:33

## 2019-09-28 RX ADMIN — TRAMADOL HYDROCHLORIDE 50 MILLIGRAM(S): 50 TABLET ORAL at 22:36

## 2019-09-28 RX ADMIN — NYSTATIN CREAM 1 APPLICATION(S): 100000 CREAM TOPICAL at 05:35

## 2019-09-28 RX ADMIN — Medication 325 MILLIGRAM(S): at 11:51

## 2019-09-28 RX ADMIN — LISINOPRIL 10 MILLIGRAM(S): 2.5 TABLET ORAL at 05:33

## 2019-09-28 RX ADMIN — TRAMADOL HYDROCHLORIDE 50 MILLIGRAM(S): 50 TABLET ORAL at 23:52

## 2019-09-28 RX ADMIN — NYSTATIN CREAM 1 APPLICATION(S): 100000 CREAM TOPICAL at 17:15

## 2019-09-28 RX ADMIN — WARFARIN SODIUM 5 MILLIGRAM(S): 2.5 TABLET ORAL at 22:39

## 2019-09-28 RX ADMIN — Medication 1 PACKET(S): at 23:18

## 2019-09-28 RX ADMIN — GABAPENTIN 100 MILLIGRAM(S): 400 CAPSULE ORAL at 17:16

## 2019-09-28 NOTE — PROGRESS NOTE ADULT - PROBLEM SELECTOR PLAN 4
-H/o recurrent CVA with left sided residual deficits   -s/p  shunt and on coumadin   -Coumadin 5 mg tonight.

## 2019-09-28 NOTE — PROGRESS NOTE ADULT - PROBLEM SELECTOR PLAN 1
- low iron levels - supplemt iron  - f/u CEA  - f/u Ca 125  - Gyn Evaluation for Uterine mass and adnexal mass  - physical therapy

## 2019-09-29 LAB
INR BLD: 1.75 RATIO — HIGH (ref 0.88–1.16)
PROTHROM AB SERPL-ACNC: 20 SEC — HIGH (ref 10–12.9)

## 2019-09-29 PROCEDURE — 99233 SBSQ HOSP IP/OBS HIGH 50: CPT

## 2019-09-29 RX ORDER — WARFARIN SODIUM 2.5 MG/1
5 TABLET ORAL ONCE
Refills: 0 | Status: COMPLETED | OUTPATIENT
Start: 2019-09-29 | End: 2019-09-29

## 2019-09-29 RX ADMIN — TRAMADOL HYDROCHLORIDE 50 MILLIGRAM(S): 50 TABLET ORAL at 08:16

## 2019-09-29 RX ADMIN — TRAMADOL HYDROCHLORIDE 50 MILLIGRAM(S): 50 TABLET ORAL at 07:16

## 2019-09-29 RX ADMIN — Medication 1 PACKET(S): at 11:48

## 2019-09-29 RX ADMIN — NYSTATIN CREAM 1 APPLICATION(S): 100000 CREAM TOPICAL at 17:14

## 2019-09-29 RX ADMIN — NYSTATIN CREAM 1 APPLICATION(S): 100000 CREAM TOPICAL at 05:40

## 2019-09-29 RX ADMIN — GABAPENTIN 100 MILLIGRAM(S): 400 CAPSULE ORAL at 05:39

## 2019-09-29 RX ADMIN — WARFARIN SODIUM 5 MILLIGRAM(S): 2.5 TABLET ORAL at 21:47

## 2019-09-29 RX ADMIN — Medication 1 PACKET(S): at 05:39

## 2019-09-29 RX ADMIN — TRAMADOL HYDROCHLORIDE 50 MILLIGRAM(S): 50 TABLET ORAL at 14:16

## 2019-09-29 RX ADMIN — Medication 325 MILLIGRAM(S): at 11:48

## 2019-09-29 RX ADMIN — GABAPENTIN 100 MILLIGRAM(S): 400 CAPSULE ORAL at 17:13

## 2019-09-29 RX ADMIN — ATORVASTATIN CALCIUM 20 MILLIGRAM(S): 80 TABLET, FILM COATED ORAL at 21:47

## 2019-09-29 RX ADMIN — TRAMADOL HYDROCHLORIDE 50 MILLIGRAM(S): 50 TABLET ORAL at 15:16

## 2019-09-29 RX ADMIN — LISINOPRIL 10 MILLIGRAM(S): 2.5 TABLET ORAL at 05:40

## 2019-09-29 NOTE — PROGRESS NOTE ADULT - PROBLEM SELECTOR PLAN 4
-H/o recurrent CVA with left sided residual deficits   -s/p  shunt and on coumadin   - I will give Coumadin 5 mg tonight. INR is 1.75

## 2019-09-29 NOTE — PROGRESS NOTE ADULT - PROBLEM SELECTOR PLAN 1
- conitnue iron suplemnts for anemia  - Gyn Evaluation for uterine mass and Adenxal mass  - Physical Therapy

## 2019-09-30 LAB
INR BLD: 2.25 RATIO — HIGH (ref 0.88–1.16)
PROTHROM AB SERPL-ACNC: 25.8 SEC — HIGH (ref 10–12.9)

## 2019-09-30 PROCEDURE — 99233 SBSQ HOSP IP/OBS HIGH 50: CPT

## 2019-09-30 RX ORDER — WARFARIN SODIUM 2.5 MG/1
3 TABLET ORAL ONCE
Refills: 0 | Status: COMPLETED | OUTPATIENT
Start: 2019-09-30 | End: 2019-09-30

## 2019-09-30 RX ADMIN — Medication 325 MILLIGRAM(S): at 12:38

## 2019-09-30 RX ADMIN — GABAPENTIN 100 MILLIGRAM(S): 400 CAPSULE ORAL at 05:19

## 2019-09-30 RX ADMIN — ATORVASTATIN CALCIUM 20 MILLIGRAM(S): 80 TABLET, FILM COATED ORAL at 21:27

## 2019-09-30 RX ADMIN — GABAPENTIN 100 MILLIGRAM(S): 400 CAPSULE ORAL at 18:20

## 2019-09-30 RX ADMIN — TRAMADOL HYDROCHLORIDE 50 MILLIGRAM(S): 50 TABLET ORAL at 23:56

## 2019-09-30 RX ADMIN — NYSTATIN CREAM 1 APPLICATION(S): 100000 CREAM TOPICAL at 05:19

## 2019-09-30 RX ADMIN — WARFARIN SODIUM 3 MILLIGRAM(S): 2.5 TABLET ORAL at 21:27

## 2019-09-30 RX ADMIN — TRAMADOL HYDROCHLORIDE 50 MILLIGRAM(S): 50 TABLET ORAL at 22:23

## 2019-09-30 RX ADMIN — NYSTATIN CREAM 1 APPLICATION(S): 100000 CREAM TOPICAL at 18:24

## 2019-09-30 RX ADMIN — LISINOPRIL 10 MILLIGRAM(S): 2.5 TABLET ORAL at 12:38

## 2019-09-30 NOTE — DIETITIAN INITIAL EVALUATION ADULT. - OTHER INFO
Pt reported good appetite, po intake varies, %. Pt reported she lives at home with her sister and brother, who do food shopping/cooking for pt. Pt's food preferences include oatmeal, fruit, fish, eggs, vegetables, bread, baked ziti. Pt with hx of CVA with L hemiparesis and pt is wheel chair bound. Pt reported UBW around 150#, however has been gradually gaining wt due to immobility. Pt currently 87.7 kg, however likely at least partially fluid-related as pt with edema. Pt on Coumadin, provided pt with Vitamin K Content of Foods handout and discussed which foods to avoid when on Coumadin as well as heart healthy foods as pt with hx of hypertension, hypercholesterolemia. Pt denied food allergies, denied difficulty chewing/swallowing, denied n/v, reported some constipation. Pt reported good appetite, po intake varies, %. Pt reported she lives at home with her sister and brother, who do food shopping/cooking for pt. Pt's food preferences include oatmeal, fruit, fish, eggs, vegetables, bread, baked ziti. Pt with hx of CVA with L hemiparesis and pt is wheel chair bound. Pt reported UBW around 150#, however has been gradually gaining wt due to immobility. Pt currently 87.7 kg, however likely partially fluid-related as pt with edema. Pt on Coumadin, provided pt with Vitamin K Content of Foods handout and discussed which foods to avoid when on Coumadin as well as heart healthy foods as pt with hx of hypertension, hypercholesterolemia. Pt denied food allergies, denied difficulty chewing/swallowing, denied n/v, reported some constipation.

## 2019-09-30 NOTE — PROGRESS NOTE ADULT - PROBLEM SELECTOR PLAN 1
patient denies trauma  pain control, no DVT on US    CT of leg C+ - diffuse edema w/o clot or bleed. Uterine mass?  vascular consult appreciated   - CT abd/pelvis: right adnexal mass, right ovarian dermoid.  - Hematology/Oncology - following  - GYN eval

## 2019-09-30 NOTE — DIETITIAN INITIAL EVALUATION ADULT. - PERTINENT MEDS FT
MEDICATIONS  (STANDING):  atorvastatin 20 milliGRAM(s) Oral at bedtime  ferrous    sulfate 325 milliGRAM(s) Oral daily  gabapentin 100 milliGRAM(s) Oral two times a day  influenza   Vaccine 0.5 milliLiter(s) IntraMuscular once  lisinopril 10 milliGRAM(s) Oral daily  nystatin Powder 1 Application(s) Topical two times a day  sodium chloride 0.9%. 1000 milliLiter(s) (85 mL/Hr) IV Continuous <Continuous>  warfarin 3 milliGRAM(s) Oral once    MEDICATIONS  (PRN):  acetaminophen   Tablet .. 650 milliGRAM(s) Oral every 6 hours PRN Moderate Pain (4 - 6)  traMADol 50 milliGRAM(s) Oral every 6 hours PRN Severe Pain (7 - 10)

## 2019-09-30 NOTE — DIETITIAN INITIAL EVALUATION ADULT. - PHYSICAL APPEARANCE
BMI: 30.3; Pt with stage I pressure ulcer on R heel & stage I pressure ulcer on L heel; Non-pitting edema L foot, ankle, leg & R foot, ankle, leg/well nourished

## 2019-09-30 NOTE — DIETITIAN INITIAL EVALUATION ADULT. - PERTINENT LABORATORY DATA
09-27: Na 142  Glu 97   K+ 4.4  Cr  0.64  BUN 13   09-28: WBC 9.52 K/uL   Phos 3.1 mg/dL 09-23 Alb 3.4 g/dL

## 2019-09-30 NOTE — PROGRESS NOTE ADULT - PROBLEM SELECTOR PLAN 4
-H/o recurrent CVA with left sided residual deficits   -s/p  shunt and on coumadin   - INR is 2.25, Coumadin 3 mg tonight.

## 2019-10-01 DIAGNOSIS — D36.9 BENIGN NEOPLASM, UNSPECIFIED SITE: ICD-10-CM

## 2019-10-01 LAB
INR BLD: 2.52 RATIO — HIGH (ref 0.88–1.16)
PROTHROM AB SERPL-ACNC: 29 SEC — HIGH (ref 10–12.9)

## 2019-10-01 PROCEDURE — 99233 SBSQ HOSP IP/OBS HIGH 50: CPT

## 2019-10-01 RX ADMIN — Medication 650 MILLIGRAM(S): at 17:12

## 2019-10-01 RX ADMIN — Medication 650 MILLIGRAM(S): at 00:21

## 2019-10-01 RX ADMIN — GABAPENTIN 100 MILLIGRAM(S): 400 CAPSULE ORAL at 05:51

## 2019-10-01 RX ADMIN — ATORVASTATIN CALCIUM 20 MILLIGRAM(S): 80 TABLET, FILM COATED ORAL at 21:15

## 2019-10-01 RX ADMIN — Medication 650 MILLIGRAM(S): at 09:02

## 2019-10-01 RX ADMIN — NYSTATIN CREAM 1 APPLICATION(S): 100000 CREAM TOPICAL at 05:53

## 2019-10-01 RX ADMIN — NYSTATIN CREAM 1 APPLICATION(S): 100000 CREAM TOPICAL at 17:15

## 2019-10-01 RX ADMIN — LISINOPRIL 10 MILLIGRAM(S): 2.5 TABLET ORAL at 05:51

## 2019-10-01 RX ADMIN — Medication 325 MILLIGRAM(S): at 12:27

## 2019-10-01 RX ADMIN — Medication 650 MILLIGRAM(S): at 06:31

## 2019-10-01 RX ADMIN — GABAPENTIN 100 MILLIGRAM(S): 400 CAPSULE ORAL at 17:12

## 2019-10-01 RX ADMIN — Medication 650 MILLIGRAM(S): at 18:12

## 2019-10-01 RX ADMIN — Medication 650 MILLIGRAM(S): at 01:57

## 2019-10-01 NOTE — CONSULT NOTE ADULT - SUBJECTIVE AND OBJECTIVE BOX
PATIENT DURING THE EVALUATION OF LEG PAIN UNDERWENT A CT WHICH REVEALED A SMALL DERMOID CYST IN ADDITION TO A DISTENSION OT THE ENDOMETIAL CAVITY
Reason for Consultation: Anemia    HPI: Patient is a 68y Female seen on consultatioin for the evaluation and management of Anemia    68 year old female PMH CVA w left hemiparesis, HTN, s/p  shunt brought by ems with daughter c/o bilateral legs swelling and pain L>R for last couple of days. Pt has a hx of CVA with left side weakness and pt is bed bound. Pt is taking coumadin and dose had been increased two days ago. Pt denies recent hx of trauma, headache, dizziness, blurred visions, light sensitivity, focal/distal weakness or numbness, cough, sob, chest pain, nausea, vomiting, fever, chills, abd pain, dysuria or irregular bowel movements. Pt anticoagulated on coumadin, ultrasound shows no DVT.  on CT scan showed Uterine mass and right adenxal mass    PAST MEDICAL & SURGICAL HISTORY:  Left hemiparesis  Hypercholesteremia  CVA (cerebral infarction)  Hypertension  Depression  S/P right inguinal hernia repair  S/P  shunt  S/P cholecystectomy      MEDICATIONS  (STANDING):  atorvastatin 20 milliGRAM(s) Oral at bedtime  gabapentin 100 milliGRAM(s) Oral two times a day  influenza   Vaccine 0.5 milliLiter(s) IntraMuscular once  lisinopril 10 milliGRAM(s) Oral daily  nystatin Powder 1 Application(s) Topical two times a day  sodium chloride 0.9%. 1000 milliLiter(s) (85 mL/Hr) IV Continuous <Continuous>    MEDICATIONS  (PRN):  acetaminophen   Tablet .. 650 milliGRAM(s) Oral every 6 hours PRN Moderate Pain (4 - 6)  traMADol 50 milliGRAM(s) Oral every 6 hours PRN Severe Pain (7 - 10)      Allergies    No Known Allergies    Intolerances        SOCIAL HISTORY:    Smoking Status: yes  Alcohol: no  Marital Status: no  Occupation: yes    FAMILY HISTORY:  Family history of stroke (Mother): mother  of stroke at 68  Family history of acute myocardial infarction (Father): father  of mi at 77      Vital Signs Last 24 Hrs  T(C): 36 (27 Sep 2019 05:00), Max: 36.9 (26 Sep 2019 16:05)  T(F): 96.8 (27 Sep 2019 05:00), Max: 98.5 (26 Sep 2019 16:05)  HR: 84 (27 Sep 2019 05:00) (84 - 104)  BP: 153/79 (27 Sep 2019 05:00) (150/72 - 159/87)  BP(mean): --  RR: 18 (27 Sep 2019 05:00) (18 - 18)  SpO2: 94% (27 Sep 2019 05:00) (94% - 99%)    PHYSICAL EXAM:    general - AAO x 3, left hemiparesis  HEENT - No Icterus  CVS - RRR  RS - AE B/L  Abd - soft, NT  Ext - Pulses +        LABS:                        8.7    7.36  )-----------( 232      ( 26 Sep 2019 06:06 )             29.8     09    142  |  111<H>  |  13  ----------------------------<  97  4.4   |  27  |  0.64    Ca    9.1      27 Sep 2019 07:17  Phos  2.0     -  Mg     2.0     09-      PT/INR - ( 26 Sep 2019 09:19 )   PT: 22.5 sec;   INR: 1.97 ratio               Culture - Urine (collected 24 Sep 2019 09:43)  Source: .Urine  Final Report (25 Sep 2019 06:33):    No growth        RADIOLOGY & ADDITIONAL STUDIES:
VASCULAR SURGERY CONSULT NOTE    Patient is a 68y old  Female who presents with a chief complaint of Leg pain. (24 Sep 2019 02:24)    HPI:  68 year old female PMH CVA w left hemiparesis, HTN, HL brought by ems with daughter c/o bilateral legs swelling and pain L>R for last couple of days. Pt has a hx of CVA with left side weakness and pt is bed bound. Pt is taking coumadin and dose had been increased two days ago. Pt denies recent hx of trauma, headache, dizziness, blurred visions, light sensitivity, focal/distal weakness or numbness, cough, sob, chest pain, nausea, vomiting, fever, chills, abd pain, dysuria or irregular bowel movements. (24 Sep 2019 02:24)    Patient seen and examined earlier in day by Dr. Walton and just seen and examined by myself. Patient c/o B/L LE swelling L>R x 2 days associated with worsening left thigh pain. Patient with h/o CVA with left side weakness, denies any numbness or tingling to extremity.   Uses a wheelchair at home.     PAST MEDICAL & SURGICAL HISTORY:  Left hemiparesis  Hypercholesteremia  CVA (cerebral infarction)  Hypertension  Depression  S/P right inguinal hernia repair  S/P  shunt  S/P cholecystectomy    Review of Systems:  Contained within HPI.    MEDICATIONS  (STANDING):  atorvastatin 20 milliGRAM(s) Oral at bedtime  gabapentin 100 milliGRAM(s) Oral two times a day  influenza   Vaccine 0.5 milliLiter(s) IntraMuscular once  lisinopril 10 milliGRAM(s) Oral daily  nystatin Powder 1 Application(s) Topical two times a day    MEDICATIONS  (PRN):  acetaminophen   Tablet .. 650 milliGRAM(s) Oral every 6 hours PRN Moderate Pain (4 - 6)      Allergies    No Known Allergies    Intolerances    SOCIAL HISTORY          Smoking: Yes [ ]  No [ ]   ______pk yrs          ETOH  Yes [ ]  No [ ]  Social [ ]          DRUGS:  Yes [ ]  No [ ]  if so what______________    FAMILY HISTORY:  Family history of stroke (Mother): mother  of stroke at 68  Family history of acute myocardial infarction (Father): father  of mi at 77      Vital Signs Last 24 Hrs  T(C): 37.4 (24 Sep 2019 11:37), Max: 37.4 (24 Sep 2019 11:37)  T(F): 99.4 (24 Sep 2019 11:37), Max: 99.4 (24 Sep 2019 11:37)  HR: 114 (24 Sep 2019 11:37) (92 - 114)  BP: 121/65 (24 Sep 2019 11:37) (121/65 - 154/75)  RR: 17 (24 Sep 2019 11:37) (17 - 20)  SpO2: 95% (24 Sep 2019 11:37) (95% - 96%)    Physical Exam:    General:  Appears stated age, well-groomed, well-nourished, no distress  Eyes : STEPHON  HENT:  WNL, no JVD  Chest:  clear breath sounds  Cardiovascular: S1S2  Abdomen: Soft, NTND   Extremities: B/L LEs edematous, Left medial thigh >R. Compartments soft, Mildly tender to palpation. Sensation grossly intact b/l, +Left LE weakness. Peripheral pulses palpated b/l.    Skin:  No rash  Musculoskeletal: +Left sided weakness due to h/o CVA  Neuro/Psych:  Alert, oriented to time, place and person       LABS:                        9.6    10.09 )-----------( 300      ( 24 Sep 2019 06:15 )             32.4     09-24    138  |  100  |  13  ----------------------------<  132<H>  3.5   |  30  |  0.79    Ca    9.8      24 Sep 2019 06:15    TPro  8.4<H>  /  Alb  3.4  /  TBili  0.4  /  DBili  x   /  AST  33  /  ALT  43  /  AlkPhos  104  09-23    PT/INR - ( 24 Sep 2019 16:41 )   PT: 26.9 sec;   INR: 2.34 ratio         PTT - ( 23 Sep 2019 17:38 )  PTT:42.3 sec  Urinalysis Basic - ( 24 Sep 2019 03:32 )    Color: Yellow / Appearance: Clear / S.010 / pH: x  Gluc: x / Ketone: Trace  / Bili: Negative / Urobili: Negative mg/dL   Blood: x / Protein: Negative mg/dL / Nitrite: Negative   Leuk Esterase: Negative / RBC: x / WBC 0-2   Sq Epi: x / Non Sq Epi: Occasional / Bacteria: Negative    RADIOLOGY & ADDITIONAL STUDIES:  CT of LE pending    < from: US Duplex Venous Lower Ext Complete, Bilateral (19 @ 19:21) >  IMPRESSION:     No evidence of deep vein thrombosis in the visualized venous segments of   either  lower extremity.     < end of copied text >

## 2019-10-01 NOTE — CONSULT NOTE ADULT - PROBLEM SELECTOR RECOMMENDATION 9
ONCE PATIENT IS MORE STABLE WOULD CONSIDER A FRACTIONAL DILATATION AND COURTAGE OF COURSE SOLA NOT SURE THAT THE PATIENT AND OF FAMILY WOULD WANT TO EXPOSE THIS PATIENT TO AN OPERATIVE PROCEDURE
- low MCV Anemia, low Iron levels, will check CEA, patient states last colonscopy over 5 years ago  - Uterine Mass, and RIght Adneaxal Mass - agree with Gyn Evaluation, may need endometrial biopsy  - check Ca 125  - stool occult blood

## 2019-10-01 NOTE — PROGRESS NOTE ADULT - PROBLEM SELECTOR PLAN 1
patient denies trauma  pain control, no DVT on US    CT of leg C+ - diffuse edema w/o clot or bleed. Uterine mass?  vascular consult appreciated   - CT abd/pelvis: right adnexal mass, right ovarian dermoid.  - Hematology/Oncology - following  - GYN eval appreciated, recommend D and C   Spoke to patients sister at length who is agreeing for the patient to have procedure.

## 2019-10-01 NOTE — PROGRESS NOTE ADULT - PROBLEM SELECTOR PLAN 4
-H/o recurrent CVA with left sided residual deficits   -s/p  shunt and on coumadin   - Hold Coumadin tonight patient might [place] D and C in patient.   - Start Heparin when INR <2.

## 2019-10-01 NOTE — CONSULT NOTE ADULT - CONSULT REASON
DERMOID CYST EVIDENCE OF ENDOMETRIAL DISTENSION
Anemia
Left medial thigh pain and b/l LE swelling L>R

## 2019-10-01 NOTE — CONSULT NOTE ADULT - ASSESSMENT
THE DERMOID CYST IS OF LES CONCERN THAN THE DISTENTION OF THE ENDOMETRIAL CAVITY.   THE DERMOID HAS A VERY LOW MALIGNANCY POTENTIAL HOWEVER IT MAY BE NECESSARY TO REEVALUATE THE CONTENTS OF THE ENDOMETRIUM
A/P:  68 year old female PMH CVA w left hemiparesis (on coumadin), HTN, HLD brought by ems with daughter c/o bilateral LE swelling and pain L>R x2 days. US of LEs NEGATIVE for DVT  Doubt compartment syndrome at this time, r/o Left medial thigh hematoma?    As per Dr. Walton, will f/u results of CT of LLE and continue to closely monitor extremity   Warm compresses and elevation   Cont. current medical management per primary team, pain management   f/u AM labs, H/H and INR
Anemia, Uterine Mass

## 2019-10-02 LAB
ANION GAP SERPL CALC-SCNC: 9 MMOL/L — SIGNIFICANT CHANGE UP (ref 5–17)
BASOPHILS # BLD AUTO: 0 K/UL — SIGNIFICANT CHANGE UP (ref 0–0.2)
BASOPHILS NFR BLD AUTO: 0 % — SIGNIFICANT CHANGE UP (ref 0–2)
BUN SERPL-MCNC: 12 MG/DL — SIGNIFICANT CHANGE UP (ref 7–23)
CALCIUM SERPL-MCNC: 10.2 MG/DL — HIGH (ref 8.5–10.1)
CHLORIDE SERPL-SCNC: 104 MMOL/L — SIGNIFICANT CHANGE UP (ref 96–108)
CO2 SERPL-SCNC: 28 MMOL/L — SIGNIFICANT CHANGE UP (ref 22–31)
CREAT SERPL-MCNC: 0.82 MG/DL — SIGNIFICANT CHANGE UP (ref 0.5–1.3)
EOSINOPHIL # BLD AUTO: 0 K/UL — SIGNIFICANT CHANGE UP (ref 0–0.5)
EOSINOPHIL NFR BLD AUTO: 0 % — SIGNIFICANT CHANGE UP (ref 0–6)
GLUCOSE SERPL-MCNC: 105 MG/DL — HIGH (ref 70–99)
HCT VFR BLD CALC: 34.6 % — SIGNIFICANT CHANGE UP (ref 34.5–45)
HGB BLD-MCNC: 10 G/DL — LOW (ref 11.5–15.5)
INR BLD: 2.49 RATIO — HIGH (ref 0.88–1.16)
LYMPHOCYTES # BLD AUTO: 19 % — SIGNIFICANT CHANGE UP (ref 13–44)
LYMPHOCYTES # BLD AUTO: 2.06 K/UL — SIGNIFICANT CHANGE UP (ref 1–3.3)
MCHC RBC-ENTMCNC: 22 PG — LOW (ref 27–34)
MCHC RBC-ENTMCNC: 28.9 GM/DL — LOW (ref 32–36)
MCV RBC AUTO: 76.2 FL — LOW (ref 80–100)
MONOCYTES # BLD AUTO: 0.54 K/UL — SIGNIFICANT CHANGE UP (ref 0–0.9)
MONOCYTES NFR BLD AUTO: 5 % — SIGNIFICANT CHANGE UP (ref 2–14)
NEUTROPHILS # BLD AUTO: 8.24 K/UL — HIGH (ref 1.8–7.4)
NEUTROPHILS NFR BLD AUTO: 76 % — SIGNIFICANT CHANGE UP (ref 43–77)
NRBC # BLD: SIGNIFICANT CHANGE UP /100 WBCS (ref 0–0)
PLATELET # BLD AUTO: 440 K/UL — HIGH (ref 150–400)
POTASSIUM SERPL-MCNC: 3.9 MMOL/L — SIGNIFICANT CHANGE UP (ref 3.5–5.3)
POTASSIUM SERPL-SCNC: 3.9 MMOL/L — SIGNIFICANT CHANGE UP (ref 3.5–5.3)
PROTHROM AB SERPL-ACNC: 28.7 SEC — HIGH (ref 10–12.9)
RBC # BLD: 4.54 M/UL — SIGNIFICANT CHANGE UP (ref 3.8–5.2)
RBC # FLD: 20.7 % — HIGH (ref 10.3–14.5)
SODIUM SERPL-SCNC: 141 MMOL/L — SIGNIFICANT CHANGE UP (ref 135–145)
WBC # BLD: 10.84 K/UL — HIGH (ref 3.8–10.5)
WBC # FLD AUTO: 10.84 K/UL — HIGH (ref 3.8–10.5)

## 2019-10-02 PROCEDURE — 99232 SBSQ HOSP IP/OBS MODERATE 35: CPT

## 2019-10-02 RX ADMIN — ATORVASTATIN CALCIUM 20 MILLIGRAM(S): 80 TABLET, FILM COATED ORAL at 23:10

## 2019-10-02 RX ADMIN — NYSTATIN CREAM 1 APPLICATION(S): 100000 CREAM TOPICAL at 05:16

## 2019-10-02 RX ADMIN — GABAPENTIN 100 MILLIGRAM(S): 400 CAPSULE ORAL at 05:16

## 2019-10-02 RX ADMIN — LISINOPRIL 10 MILLIGRAM(S): 2.5 TABLET ORAL at 05:16

## 2019-10-02 RX ADMIN — Medication 650 MILLIGRAM(S): at 09:35

## 2019-10-02 RX ADMIN — GABAPENTIN 100 MILLIGRAM(S): 400 CAPSULE ORAL at 17:12

## 2019-10-02 RX ADMIN — Medication 650 MILLIGRAM(S): at 17:36

## 2019-10-02 RX ADMIN — Medication 650 MILLIGRAM(S): at 16:27

## 2019-10-02 RX ADMIN — TRAMADOL HYDROCHLORIDE 50 MILLIGRAM(S): 50 TABLET ORAL at 08:36

## 2019-10-02 RX ADMIN — TRAMADOL HYDROCHLORIDE 50 MILLIGRAM(S): 50 TABLET ORAL at 07:37

## 2019-10-02 RX ADMIN — Medication 325 MILLIGRAM(S): at 12:06

## 2019-10-02 RX ADMIN — TRAMADOL HYDROCHLORIDE 50 MILLIGRAM(S): 50 TABLET ORAL at 23:10

## 2019-10-02 RX ADMIN — Medication 650 MILLIGRAM(S): at 08:35

## 2019-10-02 RX ADMIN — NYSTATIN CREAM 1 APPLICATION(S): 100000 CREAM TOPICAL at 17:14

## 2019-10-02 NOTE — PROGRESS NOTE ADULT - PROBLEM SELECTOR PLAN 1
- Right adnexal mass.  - denies vaginal bleeding   - GYN following- Spoke to Dr Petersen today who agreed to do D and C am tomorrow.  - No cardiac history, patient medically optimized for the procedure.

## 2019-10-02 NOTE — PROGRESS NOTE ADULT - PROBLEM SELECTOR PLAN 4
-H/o recurrent CVA with left sided residual deficits   -s/p  shunt and on coumadin   - Hold Coumadin tonight patient might need D and C in patient.   - Start Heparin when INR <2.

## 2019-10-02 NOTE — PROGRESS NOTE ADULT - PROBLEM SELECTOR PLAN 1
- Improved with iron supplemnts  - Ob/Gyn planning D and C (inpatient or outpatient)  - Physical Therapy

## 2019-10-03 LAB
APTT BLD: 137.8 SEC — CRITICAL HIGH (ref 27.5–36.3)
APTT BLD: 57.5 SEC — HIGH (ref 27.5–36.3)
HCT VFR BLD CALC: 33.8 % — LOW (ref 34.5–45)
HGB BLD-MCNC: 10.1 G/DL — LOW (ref 11.5–15.5)
INR BLD: 2.17 RATIO — HIGH (ref 0.88–1.16)
MCHC RBC-ENTMCNC: 22.7 PG — LOW (ref 27–34)
MCHC RBC-ENTMCNC: 29.9 GM/DL — LOW (ref 32–36)
MCV RBC AUTO: 76.1 FL — LOW (ref 80–100)
NRBC # BLD: 0 /100 WBCS — SIGNIFICANT CHANGE UP (ref 0–0)
PLATELET # BLD AUTO: 407 K/UL — HIGH (ref 150–400)
PROTHROM AB SERPL-ACNC: 24.9 SEC — HIGH (ref 10–12.9)
RBC # BLD: 4.44 M/UL — SIGNIFICANT CHANGE UP (ref 3.8–5.2)
RBC # FLD: 20.3 % — HIGH (ref 10.3–14.5)
WBC # BLD: 13.2 K/UL — HIGH (ref 3.8–10.5)
WBC # FLD AUTO: 13.2 K/UL — HIGH (ref 3.8–10.5)

## 2019-10-03 PROCEDURE — 99232 SBSQ HOSP IP/OBS MODERATE 35: CPT

## 2019-10-03 RX ORDER — HEPARIN SODIUM 5000 [USP'U]/ML
3000 INJECTION INTRAVENOUS; SUBCUTANEOUS EVERY 6 HOURS
Refills: 0 | Status: DISCONTINUED | OUTPATIENT
Start: 2019-10-03 | End: 2019-10-04

## 2019-10-03 RX ORDER — HEPARIN SODIUM 5000 [USP'U]/ML
6500 INJECTION INTRAVENOUS; SUBCUTANEOUS EVERY 6 HOURS
Refills: 0 | Status: DISCONTINUED | OUTPATIENT
Start: 2019-10-03 | End: 2019-10-04

## 2019-10-03 RX ORDER — HEPARIN SODIUM 5000 [USP'U]/ML
6500 INJECTION INTRAVENOUS; SUBCUTANEOUS ONCE
Refills: 0 | Status: COMPLETED | OUTPATIENT
Start: 2019-10-03 | End: 2019-10-03

## 2019-10-03 RX ORDER — HEPARIN SODIUM 5000 [USP'U]/ML
INJECTION INTRAVENOUS; SUBCUTANEOUS
Qty: 25000 | Refills: 0 | Status: DISCONTINUED | OUTPATIENT
Start: 2019-10-03 | End: 2019-10-04

## 2019-10-03 RX ADMIN — Medication 325 MILLIGRAM(S): at 11:36

## 2019-10-03 RX ADMIN — NYSTATIN CREAM 1 APPLICATION(S): 100000 CREAM TOPICAL at 06:11

## 2019-10-03 RX ADMIN — TRAMADOL HYDROCHLORIDE 50 MILLIGRAM(S): 50 TABLET ORAL at 10:30

## 2019-10-03 RX ADMIN — NYSTATIN CREAM 1 APPLICATION(S): 100000 CREAM TOPICAL at 17:17

## 2019-10-03 RX ADMIN — GABAPENTIN 100 MILLIGRAM(S): 400 CAPSULE ORAL at 06:11

## 2019-10-03 RX ADMIN — ATORVASTATIN CALCIUM 20 MILLIGRAM(S): 80 TABLET, FILM COATED ORAL at 21:31

## 2019-10-03 RX ADMIN — TRAMADOL HYDROCHLORIDE 50 MILLIGRAM(S): 50 TABLET ORAL at 16:44

## 2019-10-03 RX ADMIN — TRAMADOL HYDROCHLORIDE 50 MILLIGRAM(S): 50 TABLET ORAL at 15:44

## 2019-10-03 RX ADMIN — LISINOPRIL 10 MILLIGRAM(S): 2.5 TABLET ORAL at 06:11

## 2019-10-03 RX ADMIN — TRAMADOL HYDROCHLORIDE 50 MILLIGRAM(S): 50 TABLET ORAL at 09:30

## 2019-10-03 RX ADMIN — GABAPENTIN 100 MILLIGRAM(S): 400 CAPSULE ORAL at 19:11

## 2019-10-03 RX ADMIN — HEPARIN SODIUM 0 UNIT(S)/HR: 5000 INJECTION INTRAVENOUS; SUBCUTANEOUS at 23:44

## 2019-10-03 RX ADMIN — HEPARIN SODIUM 1400 UNIT(S)/HR: 5000 INJECTION INTRAVENOUS; SUBCUTANEOUS at 16:43

## 2019-10-03 NOTE — PROGRESS NOTE ADULT - PROBLEM SELECTOR PLAN 1
- Right adnexal mass.  - denies vaginal bleeding   - GYN following-  Dr Petersen agreed to do D and C possible    - No cardiac history, patient medically optimized for the procedure.

## 2019-10-03 NOTE — PROGRESS NOTE ADULT - PROBLEM SELECTOR PLAN 4
-H/o recurrent CVA with left sided residual deficits   -s/p  shunt and on coumadin   - Continue  Coumadin tonight patient might need D and C in patient.   - Start Heparin when INR <2.

## 2019-10-03 NOTE — PROGRESS NOTE ADULT - PROBLEM SELECTOR PLAN 9
coumadin -DVT ppx  fall and aspiration precautions

## 2019-10-03 NOTE — PROGRESS NOTE ADULT - PROBLEM SELECTOR PROBLEM 8
Other iron deficiency anemia

## 2019-10-04 LAB
ANION GAP SERPL CALC-SCNC: 8 MMOL/L — SIGNIFICANT CHANGE UP (ref 5–17)
APTT BLD: 82.2 SEC — HIGH (ref 28.5–37)
APTT BLD: 93.8 SEC — HIGH (ref 28.5–37)
BLD GP AB SCN SERPL QL: SIGNIFICANT CHANGE UP
BUN SERPL-MCNC: 13 MG/DL — SIGNIFICANT CHANGE UP (ref 7–23)
CALCIUM SERPL-MCNC: 9.8 MG/DL — SIGNIFICANT CHANGE UP (ref 8.5–10.1)
CHLORIDE SERPL-SCNC: 104 MMOL/L — SIGNIFICANT CHANGE UP (ref 96–108)
CO2 SERPL-SCNC: 25 MMOL/L — SIGNIFICANT CHANGE UP (ref 22–31)
CREAT SERPL-MCNC: 0.71 MG/DL — SIGNIFICANT CHANGE UP (ref 0.5–1.3)
GLUCOSE SERPL-MCNC: 97 MG/DL — SIGNIFICANT CHANGE UP (ref 70–99)
HCT VFR BLD CALC: 35.7 % — SIGNIFICANT CHANGE UP (ref 34.5–45)
HGB BLD-MCNC: 10.4 G/DL — LOW (ref 11.5–15.5)
INR BLD: 2 RATIO — HIGH (ref 0.88–1.16)
MAGNESIUM SERPL-MCNC: 2.4 MG/DL — SIGNIFICANT CHANGE UP (ref 1.6–2.6)
MCHC RBC-ENTMCNC: 22.5 PG — LOW (ref 27–34)
MCHC RBC-ENTMCNC: 29.1 GM/DL — LOW (ref 32–36)
MCV RBC AUTO: 77.1 FL — LOW (ref 80–100)
NRBC # BLD: 0 /100 WBCS — SIGNIFICANT CHANGE UP (ref 0–0)
PHOSPHATE SERPL-MCNC: 3.1 MG/DL — SIGNIFICANT CHANGE UP (ref 2.5–4.5)
PLATELET # BLD AUTO: 364 K/UL — SIGNIFICANT CHANGE UP (ref 150–400)
POTASSIUM SERPL-MCNC: 3.8 MMOL/L — SIGNIFICANT CHANGE UP (ref 3.5–5.3)
POTASSIUM SERPL-SCNC: 3.8 MMOL/L — SIGNIFICANT CHANGE UP (ref 3.5–5.3)
PROTHROM AB SERPL-ACNC: 22.9 SEC — HIGH (ref 10–12.9)
RBC # BLD: 4.63 M/UL — SIGNIFICANT CHANGE UP (ref 3.8–5.2)
RBC # FLD: 20.4 % — HIGH (ref 10.3–14.5)
SODIUM SERPL-SCNC: 137 MMOL/L — SIGNIFICANT CHANGE UP (ref 135–145)
WBC # BLD: 11.76 K/UL — HIGH (ref 3.8–10.5)
WBC # FLD AUTO: 11.76 K/UL — HIGH (ref 3.8–10.5)

## 2019-10-04 PROCEDURE — 99233 SBSQ HOSP IP/OBS HIGH 50: CPT

## 2019-10-04 RX ORDER — HEPARIN SODIUM 5000 [USP'U]/ML
1100 INJECTION INTRAVENOUS; SUBCUTANEOUS
Qty: 25000 | Refills: 0 | Status: DISCONTINUED | OUTPATIENT
Start: 2019-10-04 | End: 2019-10-08

## 2019-10-04 RX ORDER — TRAMADOL HYDROCHLORIDE 50 MG/1
50 TABLET ORAL EVERY 6 HOURS
Refills: 0 | Status: DISCONTINUED | OUTPATIENT
Start: 2019-10-04 | End: 2019-10-08

## 2019-10-04 RX ORDER — HEPARIN SODIUM 5000 [USP'U]/ML
3000 INJECTION INTRAVENOUS; SUBCUTANEOUS EVERY 6 HOURS
Refills: 0 | Status: DISCONTINUED | OUTPATIENT
Start: 2019-10-04 | End: 2019-10-08

## 2019-10-04 RX ORDER — HEPARIN SODIUM 5000 [USP'U]/ML
6500 INJECTION INTRAVENOUS; SUBCUTANEOUS EVERY 6 HOURS
Refills: 0 | Status: DISCONTINUED | OUTPATIENT
Start: 2019-10-04 | End: 2019-10-08

## 2019-10-04 RX ADMIN — HEPARIN SODIUM 1100 UNIT(S)/HR: 5000 INJECTION INTRAVENOUS; SUBCUTANEOUS at 10:22

## 2019-10-04 RX ADMIN — NYSTATIN CREAM 1 APPLICATION(S): 100000 CREAM TOPICAL at 06:18

## 2019-10-04 RX ADMIN — Medication 650 MILLIGRAM(S): at 17:22

## 2019-10-04 RX ADMIN — TRAMADOL HYDROCHLORIDE 50 MILLIGRAM(S): 50 TABLET ORAL at 21:40

## 2019-10-04 RX ADMIN — NYSTATIN CREAM 1 APPLICATION(S): 100000 CREAM TOPICAL at 17:25

## 2019-10-04 RX ADMIN — Medication 650 MILLIGRAM(S): at 16:43

## 2019-10-04 RX ADMIN — GABAPENTIN 100 MILLIGRAM(S): 400 CAPSULE ORAL at 06:18

## 2019-10-04 RX ADMIN — GABAPENTIN 100 MILLIGRAM(S): 400 CAPSULE ORAL at 17:25

## 2019-10-04 RX ADMIN — Medication 325 MILLIGRAM(S): at 11:30

## 2019-10-04 RX ADMIN — HEPARIN SODIUM 1100 UNIT(S)/HR: 5000 INJECTION INTRAVENOUS; SUBCUTANEOUS at 20:21

## 2019-10-04 RX ADMIN — HEPARIN SODIUM 1100 UNIT(S)/HR: 5000 INJECTION INTRAVENOUS; SUBCUTANEOUS at 00:46

## 2019-10-04 RX ADMIN — ATORVASTATIN CALCIUM 20 MILLIGRAM(S): 80 TABLET, FILM COATED ORAL at 21:48

## 2019-10-04 RX ADMIN — TRAMADOL HYDROCHLORIDE 50 MILLIGRAM(S): 50 TABLET ORAL at 20:42

## 2019-10-04 RX ADMIN — LISINOPRIL 10 MILLIGRAM(S): 2.5 TABLET ORAL at 06:18

## 2019-10-04 NOTE — PROVIDER CONTACT NOTE (CRITICAL VALUE NOTIFICATION) - ACTION/TREATMENT ORDERED:
Supplements ordered
ROX Bullock aware that heparin is held for one hour. heparin to be resumed at 00:45 at 1100 units / hour. no further orders received.

## 2019-10-05 LAB
ANION GAP SERPL CALC-SCNC: 8 MMOL/L — SIGNIFICANT CHANGE UP (ref 5–17)
APTT BLD: 102.3 SEC — HIGH (ref 28.5–37)
APTT BLD: 55.4 SEC — HIGH (ref 27.5–36.3)
APTT BLD: 63.2 SEC — HIGH (ref 27.5–36.3)
BUN SERPL-MCNC: 11 MG/DL — SIGNIFICANT CHANGE UP (ref 7–23)
CALCIUM SERPL-MCNC: 10 MG/DL — SIGNIFICANT CHANGE UP (ref 8.5–10.1)
CHLORIDE SERPL-SCNC: 104 MMOL/L — SIGNIFICANT CHANGE UP (ref 96–108)
CO2 SERPL-SCNC: 28 MMOL/L — SIGNIFICANT CHANGE UP (ref 22–31)
CREAT SERPL-MCNC: 0.68 MG/DL — SIGNIFICANT CHANGE UP (ref 0.5–1.3)
GLUCOSE SERPL-MCNC: 103 MG/DL — HIGH (ref 70–99)
HCT VFR BLD CALC: 31.6 % — LOW (ref 34.5–45)
HGB BLD-MCNC: 9.4 G/DL — LOW (ref 11.5–15.5)
INR BLD: 1.55 RATIO — HIGH (ref 0.88–1.16)
INR BLD: 1.86 RATIO — HIGH (ref 0.88–1.16)
MCHC RBC-ENTMCNC: 22.5 PG — LOW (ref 27–34)
MCHC RBC-ENTMCNC: 29.7 GM/DL — LOW (ref 32–36)
MCV RBC AUTO: 75.8 FL — LOW (ref 80–100)
NRBC # BLD: 0 /100 WBCS — SIGNIFICANT CHANGE UP (ref 0–0)
PLATELET # BLD AUTO: 358 K/UL — SIGNIFICANT CHANGE UP (ref 150–400)
POTASSIUM SERPL-MCNC: 3.7 MMOL/L — SIGNIFICANT CHANGE UP (ref 3.5–5.3)
POTASSIUM SERPL-SCNC: 3.7 MMOL/L — SIGNIFICANT CHANGE UP (ref 3.5–5.3)
PROTHROM AB SERPL-ACNC: 17.6 SEC — HIGH (ref 10–12.9)
PROTHROM AB SERPL-ACNC: 21.2 SEC — HIGH (ref 10–12.9)
RBC # BLD: 4.17 M/UL — SIGNIFICANT CHANGE UP (ref 3.8–5.2)
RBC # FLD: 19.9 % — HIGH (ref 10.3–14.5)
SODIUM SERPL-SCNC: 140 MMOL/L — SIGNIFICANT CHANGE UP (ref 135–145)
WBC # BLD: 10.02 K/UL — SIGNIFICANT CHANGE UP (ref 3.8–10.5)
WBC # FLD AUTO: 10.02 K/UL — SIGNIFICANT CHANGE UP (ref 3.8–10.5)

## 2019-10-05 PROCEDURE — 99232 SBSQ HOSP IP/OBS MODERATE 35: CPT

## 2019-10-05 RX ORDER — PHYTONADIONE (VIT K1) 5 MG
5 TABLET ORAL ONCE
Refills: 0 | Status: COMPLETED | OUTPATIENT
Start: 2019-10-05 | End: 2019-10-05

## 2019-10-05 RX ADMIN — LISINOPRIL 10 MILLIGRAM(S): 2.5 TABLET ORAL at 06:36

## 2019-10-05 RX ADMIN — HEPARIN SODIUM 900 UNIT(S)/HR: 5000 INJECTION INTRAVENOUS; SUBCUTANEOUS at 11:26

## 2019-10-05 RX ADMIN — HEPARIN SODIUM 900 UNIT(S)/HR: 5000 INJECTION INTRAVENOUS; SUBCUTANEOUS at 02:22

## 2019-10-05 RX ADMIN — ATORVASTATIN CALCIUM 20 MILLIGRAM(S): 80 TABLET, FILM COATED ORAL at 22:06

## 2019-10-05 RX ADMIN — HEPARIN SODIUM 1100 UNIT(S)/HR: 5000 INJECTION INTRAVENOUS; SUBCUTANEOUS at 19:33

## 2019-10-05 RX ADMIN — Medication 325 MILLIGRAM(S): at 12:18

## 2019-10-05 RX ADMIN — NYSTATIN CREAM 1 APPLICATION(S): 100000 CREAM TOPICAL at 06:36

## 2019-10-05 RX ADMIN — NYSTATIN CREAM 1 APPLICATION(S): 100000 CREAM TOPICAL at 18:41

## 2019-10-05 RX ADMIN — Medication 5 MILLIGRAM(S): at 16:51

## 2019-10-05 RX ADMIN — GABAPENTIN 100 MILLIGRAM(S): 400 CAPSULE ORAL at 18:37

## 2019-10-05 RX ADMIN — GABAPENTIN 100 MILLIGRAM(S): 400 CAPSULE ORAL at 06:36

## 2019-10-06 LAB
ANION GAP SERPL CALC-SCNC: 7 MMOL/L — SIGNIFICANT CHANGE UP (ref 5–17)
APTT BLD: 70.9 SEC — HIGH (ref 27.5–36.3)
APTT BLD: 80.4 SEC — HIGH (ref 28.5–37)
BUN SERPL-MCNC: 9 MG/DL — SIGNIFICANT CHANGE UP (ref 7–23)
CALCIUM SERPL-MCNC: 10.2 MG/DL — HIGH (ref 8.5–10.1)
CHLORIDE SERPL-SCNC: 105 MMOL/L — SIGNIFICANT CHANGE UP (ref 96–108)
CO2 SERPL-SCNC: 28 MMOL/L — SIGNIFICANT CHANGE UP (ref 22–31)
CREAT SERPL-MCNC: 0.7 MG/DL — SIGNIFICANT CHANGE UP (ref 0.5–1.3)
GLUCOSE SERPL-MCNC: 101 MG/DL — HIGH (ref 70–99)
HCT VFR BLD CALC: 33.2 % — LOW (ref 34.5–45)
HGB BLD-MCNC: 9.6 G/DL — LOW (ref 11.5–15.5)
INR BLD: 1.28 RATIO — HIGH (ref 0.88–1.16)
MAGNESIUM SERPL-MCNC: 2.4 MG/DL — SIGNIFICANT CHANGE UP (ref 1.6–2.6)
MCHC RBC-ENTMCNC: 22 PG — LOW (ref 27–34)
MCHC RBC-ENTMCNC: 28.9 GM/DL — LOW (ref 32–36)
MCV RBC AUTO: 76 FL — LOW (ref 80–100)
NRBC # BLD: 0 /100 WBCS — SIGNIFICANT CHANGE UP (ref 0–0)
PHOSPHATE SERPL-MCNC: 3 MG/DL — SIGNIFICANT CHANGE UP (ref 2.5–4.5)
PLATELET # BLD AUTO: 422 K/UL — HIGH (ref 150–400)
POTASSIUM SERPL-MCNC: 3.7 MMOL/L — SIGNIFICANT CHANGE UP (ref 3.5–5.3)
POTASSIUM SERPL-SCNC: 3.7 MMOL/L — SIGNIFICANT CHANGE UP (ref 3.5–5.3)
PROTHROM AB SERPL-ACNC: 14.4 SEC — HIGH (ref 10–12.9)
RBC # BLD: 4.37 M/UL — SIGNIFICANT CHANGE UP (ref 3.8–5.2)
RBC # FLD: 20.1 % — HIGH (ref 10.3–14.5)
SODIUM SERPL-SCNC: 140 MMOL/L — SIGNIFICANT CHANGE UP (ref 135–145)
WBC # BLD: 9.39 K/UL — SIGNIFICANT CHANGE UP (ref 3.8–10.5)
WBC # FLD AUTO: 9.39 K/UL — SIGNIFICANT CHANGE UP (ref 3.8–10.5)

## 2019-10-06 PROCEDURE — 99232 SBSQ HOSP IP/OBS MODERATE 35: CPT

## 2019-10-06 RX ADMIN — GABAPENTIN 100 MILLIGRAM(S): 400 CAPSULE ORAL at 05:14

## 2019-10-06 RX ADMIN — Medication 650 MILLIGRAM(S): at 17:15

## 2019-10-06 RX ADMIN — HEPARIN SODIUM 1100 UNIT(S)/HR: 5000 INJECTION INTRAVENOUS; SUBCUTANEOUS at 08:50

## 2019-10-06 RX ADMIN — HEPARIN SODIUM 1100 UNIT(S)/HR: 5000 INJECTION INTRAVENOUS; SUBCUTANEOUS at 02:49

## 2019-10-06 RX ADMIN — Medication 325 MILLIGRAM(S): at 11:59

## 2019-10-06 RX ADMIN — LISINOPRIL 10 MILLIGRAM(S): 2.5 TABLET ORAL at 05:14

## 2019-10-06 RX ADMIN — Medication 650 MILLIGRAM(S): at 23:00

## 2019-10-06 RX ADMIN — Medication 650 MILLIGRAM(S): at 01:32

## 2019-10-06 RX ADMIN — Medication 650 MILLIGRAM(S): at 00:56

## 2019-10-06 RX ADMIN — NYSTATIN CREAM 1 APPLICATION(S): 100000 CREAM TOPICAL at 18:49

## 2019-10-06 RX ADMIN — NYSTATIN CREAM 1 APPLICATION(S): 100000 CREAM TOPICAL at 05:13

## 2019-10-06 RX ADMIN — ATORVASTATIN CALCIUM 20 MILLIGRAM(S): 80 TABLET, FILM COATED ORAL at 22:31

## 2019-10-06 RX ADMIN — Medication 650 MILLIGRAM(S): at 22:31

## 2019-10-06 RX ADMIN — GABAPENTIN 100 MILLIGRAM(S): 400 CAPSULE ORAL at 18:49

## 2019-10-06 RX ADMIN — Medication 650 MILLIGRAM(S): at 16:25

## 2019-10-07 LAB
ANION GAP SERPL CALC-SCNC: 8 MMOL/L — SIGNIFICANT CHANGE UP (ref 5–17)
APTT BLD: 66.6 SEC — HIGH (ref 27.5–36.3)
BUN SERPL-MCNC: 9 MG/DL — SIGNIFICANT CHANGE UP (ref 7–23)
CALCIUM SERPL-MCNC: 10 MG/DL — SIGNIFICANT CHANGE UP (ref 8.5–10.1)
CHLORIDE SERPL-SCNC: 107 MMOL/L — SIGNIFICANT CHANGE UP (ref 96–108)
CO2 SERPL-SCNC: 27 MMOL/L — SIGNIFICANT CHANGE UP (ref 22–31)
CREAT SERPL-MCNC: 0.72 MG/DL — SIGNIFICANT CHANGE UP (ref 0.5–1.3)
GLUCOSE SERPL-MCNC: 105 MG/DL — HIGH (ref 70–99)
HCT VFR BLD CALC: 33.2 % — LOW (ref 34.5–45)
HGB BLD-MCNC: 9.7 G/DL — LOW (ref 11.5–15.5)
INR BLD: 1.12 RATIO — SIGNIFICANT CHANGE UP (ref 0.88–1.16)
MAGNESIUM SERPL-MCNC: 2.4 MG/DL — SIGNIFICANT CHANGE UP (ref 1.6–2.6)
MCHC RBC-ENTMCNC: 22.5 PG — LOW (ref 27–34)
MCHC RBC-ENTMCNC: 29.2 GM/DL — LOW (ref 32–36)
MCV RBC AUTO: 76.9 FL — LOW (ref 80–100)
NRBC # BLD: 0 /100 WBCS — SIGNIFICANT CHANGE UP (ref 0–0)
PHOSPHATE SERPL-MCNC: 3 MG/DL — SIGNIFICANT CHANGE UP (ref 2.5–4.5)
PLATELET # BLD AUTO: 420 K/UL — HIGH (ref 150–400)
POTASSIUM SERPL-MCNC: 3.4 MMOL/L — LOW (ref 3.5–5.3)
POTASSIUM SERPL-SCNC: 3.4 MMOL/L — LOW (ref 3.5–5.3)
PROTHROM AB SERPL-ACNC: 12.6 SEC — SIGNIFICANT CHANGE UP (ref 10–12.9)
RBC # BLD: 4.32 M/UL — SIGNIFICANT CHANGE UP (ref 3.8–5.2)
RBC # FLD: 19.8 % — HIGH (ref 10.3–14.5)
SODIUM SERPL-SCNC: 142 MMOL/L — SIGNIFICANT CHANGE UP (ref 135–145)
WBC # BLD: 10.07 K/UL — SIGNIFICANT CHANGE UP (ref 3.8–10.5)
WBC # FLD AUTO: 10.07 K/UL — SIGNIFICANT CHANGE UP (ref 3.8–10.5)

## 2019-10-07 PROCEDURE — 99232 SBSQ HOSP IP/OBS MODERATE 35: CPT

## 2019-10-07 RX ORDER — SODIUM CHLORIDE 9 MG/ML
1000 INJECTION INTRAMUSCULAR; INTRAVENOUS; SUBCUTANEOUS
Refills: 0 | Status: DISCONTINUED | OUTPATIENT
Start: 2019-10-07 | End: 2019-10-08

## 2019-10-07 RX ORDER — POTASSIUM CHLORIDE 20 MEQ
40 PACKET (EA) ORAL EVERY 4 HOURS
Refills: 0 | Status: COMPLETED | OUTPATIENT
Start: 2019-10-07 | End: 2019-10-07

## 2019-10-07 RX ADMIN — GABAPENTIN 100 MILLIGRAM(S): 400 CAPSULE ORAL at 17:21

## 2019-10-07 RX ADMIN — SODIUM CHLORIDE 60 MILLILITER(S): 9 INJECTION INTRAMUSCULAR; INTRAVENOUS; SUBCUTANEOUS at 19:59

## 2019-10-07 RX ADMIN — NYSTATIN CREAM 1 APPLICATION(S): 100000 CREAM TOPICAL at 17:22

## 2019-10-07 RX ADMIN — ATORVASTATIN CALCIUM 20 MILLIGRAM(S): 80 TABLET, FILM COATED ORAL at 22:40

## 2019-10-07 RX ADMIN — LISINOPRIL 10 MILLIGRAM(S): 2.5 TABLET ORAL at 05:47

## 2019-10-07 RX ADMIN — Medication 40 MILLIEQUIVALENT(S): at 22:42

## 2019-10-07 RX ADMIN — GABAPENTIN 100 MILLIGRAM(S): 400 CAPSULE ORAL at 05:47

## 2019-10-07 RX ADMIN — Medication 650 MILLIGRAM(S): at 22:40

## 2019-10-07 RX ADMIN — Medication 40 MILLIEQUIVALENT(S): at 17:21

## 2019-10-07 RX ADMIN — Medication 325 MILLIGRAM(S): at 11:18

## 2019-10-07 RX ADMIN — HEPARIN SODIUM 1100 UNIT(S)/HR: 5000 INJECTION INTRAVENOUS; SUBCUTANEOUS at 07:19

## 2019-10-07 RX ADMIN — Medication 650 MILLIGRAM(S): at 16:32

## 2019-10-07 RX ADMIN — NYSTATIN CREAM 1 APPLICATION(S): 100000 CREAM TOPICAL at 05:46

## 2019-10-07 RX ADMIN — Medication 650 MILLIGRAM(S): at 23:10

## 2019-10-08 ENCOUNTER — RESULT REVIEW (OUTPATIENT)
Age: 69
End: 2019-10-08

## 2019-10-08 ENCOUNTER — TRANSCRIPTION ENCOUNTER (OUTPATIENT)
Age: 69
End: 2019-10-08

## 2019-10-08 LAB
ANION GAP SERPL CALC-SCNC: 4 MMOL/L — LOW (ref 5–17)
APTT BLD: 42.9 SEC — HIGH (ref 28.5–37)
APTT BLD: 81.8 SEC — HIGH (ref 28.5–37)
BLD GP AB SCN SERPL QL: SIGNIFICANT CHANGE UP
BUN SERPL-MCNC: 8 MG/DL — SIGNIFICANT CHANGE UP (ref 7–23)
CALCIUM SERPL-MCNC: 10.5 MG/DL — HIGH (ref 8.5–10.1)
CHLORIDE SERPL-SCNC: 107 MMOL/L — SIGNIFICANT CHANGE UP (ref 96–108)
CO2 SERPL-SCNC: 27 MMOL/L — SIGNIFICANT CHANGE UP (ref 22–31)
CREAT SERPL-MCNC: 0.64 MG/DL — SIGNIFICANT CHANGE UP (ref 0.5–1.3)
GLUCOSE SERPL-MCNC: 94 MG/DL — SIGNIFICANT CHANGE UP (ref 70–99)
HCT VFR BLD CALC: 32.4 % — LOW (ref 34.5–45)
HCT VFR BLD CALC: 33.6 % — LOW (ref 34.5–45)
HGB BLD-MCNC: 9.4 G/DL — LOW (ref 11.5–15.5)
HGB BLD-MCNC: 9.8 G/DL — LOW (ref 11.5–15.5)
INR BLD: 1.11 RATIO — SIGNIFICANT CHANGE UP (ref 0.88–1.16)
MAGNESIUM SERPL-MCNC: 2.4 MG/DL — SIGNIFICANT CHANGE UP (ref 1.6–2.6)
MCHC RBC-ENTMCNC: 22.2 PG — LOW (ref 27–34)
MCHC RBC-ENTMCNC: 22.5 PG — LOW (ref 27–34)
MCHC RBC-ENTMCNC: 29 GM/DL — LOW (ref 32–36)
MCHC RBC-ENTMCNC: 29.2 GM/DL — LOW (ref 32–36)
MCV RBC AUTO: 76.6 FL — LOW (ref 80–100)
MCV RBC AUTO: 77.2 FL — LOW (ref 80–100)
NRBC # BLD: 0 /100 WBCS — SIGNIFICANT CHANGE UP (ref 0–0)
NRBC # BLD: 0 /100 WBCS — SIGNIFICANT CHANGE UP (ref 0–0)
PHOSPHATE SERPL-MCNC: 2.9 MG/DL — SIGNIFICANT CHANGE UP (ref 2.5–4.5)
PLATELET # BLD AUTO: 384 K/UL — SIGNIFICANT CHANGE UP (ref 150–400)
PLATELET # BLD AUTO: 416 K/UL — HIGH (ref 150–400)
POTASSIUM SERPL-MCNC: 4.5 MMOL/L — SIGNIFICANT CHANGE UP (ref 3.5–5.3)
POTASSIUM SERPL-SCNC: 4.5 MMOL/L — SIGNIFICANT CHANGE UP (ref 3.5–5.3)
PROTHROM AB SERPL-ACNC: 12.5 SEC — SIGNIFICANT CHANGE UP (ref 10–12.9)
RBC # BLD: 4.23 M/UL — SIGNIFICANT CHANGE UP (ref 3.8–5.2)
RBC # BLD: 4.35 M/UL — SIGNIFICANT CHANGE UP (ref 3.8–5.2)
RBC # FLD: 19.8 % — HIGH (ref 10.3–14.5)
RBC # FLD: 19.9 % — HIGH (ref 10.3–14.5)
SODIUM SERPL-SCNC: 138 MMOL/L — SIGNIFICANT CHANGE UP (ref 135–145)
WBC # BLD: 12.22 K/UL — HIGH (ref 3.8–10.5)
WBC # BLD: 9.13 K/UL — SIGNIFICANT CHANGE UP (ref 3.8–10.5)
WBC # FLD AUTO: 12.22 K/UL — HIGH (ref 3.8–10.5)
WBC # FLD AUTO: 9.13 K/UL — SIGNIFICANT CHANGE UP (ref 3.8–10.5)

## 2019-10-08 PROCEDURE — 88305 TISSUE EXAM BY PATHOLOGIST: CPT | Mod: 26

## 2019-10-08 PROCEDURE — 99232 SBSQ HOSP IP/OBS MODERATE 35: CPT

## 2019-10-08 RX ORDER — FERROUS SULFATE 325(65) MG
325 TABLET ORAL DAILY
Refills: 0 | Status: DISCONTINUED | OUTPATIENT
Start: 2019-10-08 | End: 2019-10-10

## 2019-10-08 RX ORDER — GABAPENTIN 400 MG/1
100 CAPSULE ORAL
Refills: 0 | Status: DISCONTINUED | OUTPATIENT
Start: 2019-10-08 | End: 2019-10-10

## 2019-10-08 RX ORDER — HEPARIN SODIUM 5000 [USP'U]/ML
6500 INJECTION INTRAVENOUS; SUBCUTANEOUS EVERY 6 HOURS
Refills: 0 | Status: DISCONTINUED | OUTPATIENT
Start: 2019-10-08 | End: 2019-10-09

## 2019-10-08 RX ORDER — LISINOPRIL 2.5 MG/1
10 TABLET ORAL DAILY
Refills: 0 | Status: DISCONTINUED | OUTPATIENT
Start: 2019-10-08 | End: 2019-10-10

## 2019-10-08 RX ORDER — LACTULOSE 10 G/15ML
10 SOLUTION ORAL
Refills: 0 | Status: DISCONTINUED | OUTPATIENT
Start: 2019-10-08 | End: 2019-10-10

## 2019-10-08 RX ORDER — SODIUM CHLORIDE 9 MG/ML
1000 INJECTION, SOLUTION INTRAVENOUS
Refills: 0 | Status: DISCONTINUED | OUTPATIENT
Start: 2019-10-08 | End: 2019-10-08

## 2019-10-08 RX ORDER — FENTANYL CITRATE 50 UG/ML
25 INJECTION INTRAVENOUS
Refills: 0 | Status: DISCONTINUED | OUTPATIENT
Start: 2019-10-08 | End: 2019-10-10

## 2019-10-08 RX ORDER — ACETAMINOPHEN 500 MG
1000 TABLET ORAL ONCE
Refills: 0 | Status: DISCONTINUED | OUTPATIENT
Start: 2019-10-08 | End: 2019-10-08

## 2019-10-08 RX ORDER — HEPARIN SODIUM 5000 [USP'U]/ML
3000 INJECTION INTRAVENOUS; SUBCUTANEOUS EVERY 6 HOURS
Refills: 0 | Status: DISCONTINUED | OUTPATIENT
Start: 2019-10-08 | End: 2019-10-09

## 2019-10-08 RX ORDER — HEPARIN SODIUM 5000 [USP'U]/ML
1100 INJECTION INTRAVENOUS; SUBCUTANEOUS
Qty: 25000 | Refills: 0 | Status: DISCONTINUED | OUTPATIENT
Start: 2019-10-08 | End: 2019-10-09

## 2019-10-08 RX ORDER — ACETAMINOPHEN 500 MG
650 TABLET ORAL EVERY 6 HOURS
Refills: 0 | Status: DISCONTINUED | OUTPATIENT
Start: 2019-10-08 | End: 2019-10-10

## 2019-10-08 RX ORDER — WARFARIN SODIUM 2.5 MG/1
5 TABLET ORAL ONCE
Refills: 0 | Status: COMPLETED | OUTPATIENT
Start: 2019-10-08 | End: 2019-10-08

## 2019-10-08 RX ORDER — FENTANYL CITRATE 50 UG/ML
25 INJECTION INTRAVENOUS
Refills: 0 | Status: DISCONTINUED | OUTPATIENT
Start: 2019-10-08 | End: 2019-10-08

## 2019-10-08 RX ORDER — SODIUM CHLORIDE 9 MG/ML
1000 INJECTION, SOLUTION INTRAVENOUS
Refills: 0 | Status: DISCONTINUED | OUTPATIENT
Start: 2019-10-08 | End: 2019-10-10

## 2019-10-08 RX ADMIN — Medication 650 MILLIGRAM(S): at 21:36

## 2019-10-08 RX ADMIN — WARFARIN SODIUM 5 MILLIGRAM(S): 2.5 TABLET ORAL at 21:36

## 2019-10-08 RX ADMIN — Medication 650 MILLIGRAM(S): at 22:30

## 2019-10-08 RX ADMIN — SODIUM CHLORIDE 75 MILLILITER(S): 9 INJECTION, SOLUTION INTRAVENOUS at 17:20

## 2019-10-08 RX ADMIN — SODIUM CHLORIDE 60 MILLILITER(S): 9 INJECTION INTRAMUSCULAR; INTRAVENOUS; SUBCUTANEOUS at 12:18

## 2019-10-08 RX ADMIN — Medication 325 MILLIGRAM(S): at 17:17

## 2019-10-08 RX ADMIN — GABAPENTIN 100 MILLIGRAM(S): 400 CAPSULE ORAL at 05:17

## 2019-10-08 RX ADMIN — LISINOPRIL 10 MILLIGRAM(S): 2.5 TABLET ORAL at 05:17

## 2019-10-08 RX ADMIN — GABAPENTIN 100 MILLIGRAM(S): 400 CAPSULE ORAL at 17:17

## 2019-10-08 RX ADMIN — NYSTATIN CREAM 1 APPLICATION(S): 100000 CREAM TOPICAL at 05:18

## 2019-10-08 RX ADMIN — HEPARIN SODIUM 1400 UNIT(S)/HR: 5000 INJECTION INTRAVENOUS; SUBCUTANEOUS at 17:17

## 2019-10-08 RX ADMIN — LACTULOSE 10 GRAM(S): 10 SOLUTION ORAL at 18:23

## 2019-10-08 NOTE — PROGRESS NOTE ADULT - PROBLEM SELECTOR PLAN 2
- elevated CK, improving with IVF  - now down to 486
elevated CK, improving   continue with IVF   monitor CK
elevated CK  continue with IVF   monitor CK
elevated CK, improving with IVF  monitor CK
elevated CK, improving with IVF  monitor CPK,
elevated CK, improving with IVF  monitor CPK, last  today
needs PT, OT
patient denies trauma  pain control, no DVT on US    CT of leg C+ - diffuse edema w/o clot or bleed. Uterine mass?  vascular consult appreciated   - CT abd/pelvis: right adnexal mass, right ovarian dermoid.  - Hematology/Oncology - following  - GYN eval appreciated, recommend D and C   Spoke to patients sister at length who is agreeing for the patient to have procedure.   - Gyn aware, Coumadin on hold.   -Possible D and C am tomorrow.
patient denies trauma  pain control, no DVT on US    CT of leg C+ - diffuse edema w/o clot or bleed. Uterine mass?  vascular consult appreciated   - CT abd/pelvis: right adnexal mass, right ovarian dermoid.  - Hematology/Oncology - following  - GYN eval appreciated, recommend D and C   Spoke to patients sister at length who is agreeing for the patient to have procedure.   - Gyn aware, Coumadin on hold.   -Possible D and C am tomorrow.
pt
- resolved

## 2019-10-08 NOTE — PROGRESS NOTE ADULT - PROBLEM SELECTOR PROBLEM 2
Non-traumatic rhabdomyolysis
Non-traumatic rhabdomyolysis
Left hemiparesis
Leg pain, diffuse, left
Leg pain, diffuse, left
Non-traumatic rhabdomyolysis

## 2019-10-08 NOTE — CHART NOTE - NSCHARTNOTEFT_GEN_A_CORE
Assessment: Pt seen for follow-up. Pt with hx CVA Lt Ricky & uterine mass, Vaginal bleeding due to fibroids pending DNC today. Pt bedbound/immobile presents with constipation would recommend Laxative    Factors impacting intake: [ ] none [ ] nausea  [ ] vomiting [ ] diarrhea [x ] constipation  [ ]chewing problems [ ] swallowing issues  [ ] other:     Diet Prescription: Diet, DASH/TLC:   Sodium & Cholesterol Restricted  Supplement Feeding Modality:  Oral  Ensure Enlive Cans or Servings Per Day:  1       Frequency:  Daily (09-30-19 @ 13:18)  Diet, NPO:   NPO for Procedure/Test     NPO Start Date: 07-Oct-2019,   NPO Start Time: 23:59  Except Medications (10-06-19 @ 15:24)  Diet, NPO after Midnight:      NPO Start Date: 07-Oct-2019,   NPO Start Time: 23:59 (10-07-19 @ 16:29)    Intake: Pt fed by staff 50-60% most meals. Pt presents with constipation, pt reports No BM x 7 days; RN aware. Pt dislikes & refusing Ensure Enlive & requests rice pudding & ice cream.     Current Weight: Weight (kg): Wt=83kg(10/8), Wt=80 (10-04 @ 04:25), Wt=80.1kg(9/24)  % Weight Change  3kg wt gain x 4 days & 2.9kg wt gain x 2 weeks    Physical appearance: +1 edema Nik feet    Pertinent Medications: MEDICATIONS  (STANDING):  atorvastatin 20 milliGRAM(s) Oral at bedtime  ferrous    sulfate 325 milliGRAM(s) Oral daily  gabapentin 100 milliGRAM(s) Oral two times a day  heparin  Infusion. 1100 Unit(s)/Hr (11 mL/Hr) IV Continuous <Continuous>  influenza   Vaccine 0.5 milliLiter(s) IntraMuscular once  lisinopril 10 milliGRAM(s) Oral daily  nystatin Powder 1 Application(s) Topical two times a day  sodium chloride 0.9%. 1000 milliLiter(s) (60 mL/Hr) IV Continuous <Continuous>    MEDICATIONS  (PRN):  acetaminophen   Tablet .. 650 milliGRAM(s) Oral every 6 hours PRN Moderate Pain (4 - 6)  heparin  Injectable 6500 Unit(s) IV Push every 6 hours PRN For aPTT less than 40  heparin  Injectable 3000 Unit(s) IV Push every 6 hours PRN For aPTT between 40 - 57  traMADol 50 milliGRAM(s) Oral every 6 hours PRN Severe Pain (7 - 10)    Pertinent Labs: 10-07 Na 142   Glu 105   K+ 3.4   Cr 0.72   BUN 9   Phos n/a   Alb 3.4(9/23)   PAB n/a   Hgb 9.8 g/dL<L> Hct 33.6 %<L> HgA1C n/a     24Hr FS:  Skin: Rt & Lt heel stage I    Estimated Needs:   [ ] no change since previous assessment   [x ] recalculated: Energy needs 1400-1700kcal (25-30kcal/kg IBW) & Protein needs 55-65gm (1.0-1.2gm/kg IBW) & Fluid needs 1400-1700ml (25-30ml/kg IBW)    Previous Nutrition Diagnosis:   Nutrition Diagnostic Terminology #1 Increased Nutrient Needs...     Increased Nutrient Needs Calories, protein, micronutrients.     Etiology Compromised skin integrity.     Signs/Symptoms Stage I pressure ulcer x 2.     Goal/Expected Outcome Po intake > 75% for meals/supplement to promote wound healing; not met.    Nutrition Diagnosis is [ ] ongoing  [ ] resolved  [ ] improved  [x ] not applicable       New Nutrition Diagnosis: [x ] Inadequate Energy Intake [ ]Inadequate Oral Intake [ ] Excessive Energy Intake   [ ] Underweight [ ] Increased Nutrient Needs [ ] Overweight/Obesity   [ ] Altered GI Function [ ] Unintended Weight Loss [ ] Food & Nutrition Related Knowledge Deficit [ ] Malnutrition     Related to: Constipation    As evidenced by: Po intake 50-60% most meals    Goal: Pt to consume >75% meals/nourishments      Interventions:   Recommend  [ ] Continue:   [x ] Change Diet To: Advance po diet when medically feasible Low Na/rice pudding & ice cream daily & prune juice daily  [ ] Nutrition Supplement:  [ ] Nutrition Support:  [x ] Other: Consider laxative due to no BM x 1 week    Monitoring and Evaluation:   [ x] PO intake [ x ] Tolerance to diet prescription [ x ] weights [ x ] labs[ x ] follow up per protocol  [ ] other:

## 2019-10-09 LAB
ANION GAP SERPL CALC-SCNC: 7 MMOL/L — SIGNIFICANT CHANGE UP (ref 5–17)
APTT BLD: 147 SEC — CRITICAL HIGH (ref 27.5–36.3)
APTT BLD: 49 SEC — HIGH (ref 27.5–36.3)
BUN SERPL-MCNC: 10 MG/DL — SIGNIFICANT CHANGE UP (ref 7–23)
CALCIUM SERPL-MCNC: 10.4 MG/DL — HIGH (ref 8.5–10.1)
CHLORIDE SERPL-SCNC: 105 MMOL/L — SIGNIFICANT CHANGE UP (ref 96–108)
CO2 SERPL-SCNC: 28 MMOL/L — SIGNIFICANT CHANGE UP (ref 22–31)
CREAT SERPL-MCNC: 0.77 MG/DL — SIGNIFICANT CHANGE UP (ref 0.5–1.3)
GLUCOSE SERPL-MCNC: 100 MG/DL — HIGH (ref 70–99)
HCT VFR BLD CALC: 31.9 % — LOW (ref 34.5–45)
HGB BLD-MCNC: 9.3 G/DL — LOW (ref 11.5–15.5)
INR BLD: 1.14 RATIO — SIGNIFICANT CHANGE UP (ref 0.88–1.16)
MCHC RBC-ENTMCNC: 22.4 PG — LOW (ref 27–34)
MCHC RBC-ENTMCNC: 29.2 GM/DL — LOW (ref 32–36)
MCV RBC AUTO: 76.7 FL — LOW (ref 80–100)
NRBC # BLD: 0 /100 WBCS — SIGNIFICANT CHANGE UP (ref 0–0)
PLATELET # BLD AUTO: 374 K/UL — SIGNIFICANT CHANGE UP (ref 150–400)
POTASSIUM SERPL-MCNC: 4.2 MMOL/L — SIGNIFICANT CHANGE UP (ref 3.5–5.3)
POTASSIUM SERPL-SCNC: 4.2 MMOL/L — SIGNIFICANT CHANGE UP (ref 3.5–5.3)
PROTHROM AB SERPL-ACNC: 12.8 SEC — SIGNIFICANT CHANGE UP (ref 10–12.9)
RBC # BLD: 4.16 M/UL — SIGNIFICANT CHANGE UP (ref 3.8–5.2)
RBC # FLD: 19.8 % — HIGH (ref 10.3–14.5)
SODIUM SERPL-SCNC: 140 MMOL/L — SIGNIFICANT CHANGE UP (ref 135–145)
SURGICAL PATHOLOGY STUDY: SIGNIFICANT CHANGE UP
WBC # BLD: 10.08 K/UL — SIGNIFICANT CHANGE UP (ref 3.8–10.5)
WBC # FLD AUTO: 10.08 K/UL — SIGNIFICANT CHANGE UP (ref 3.8–10.5)

## 2019-10-09 PROCEDURE — 99233 SBSQ HOSP IP/OBS HIGH 50: CPT

## 2019-10-09 RX ORDER — CLOTRIMAZOLE AND BETAMETHASONE DIPROPIONATE 10; .5 MG/G; MG/G
1 CREAM TOPICAL
Refills: 0 | Status: DISCONTINUED | OUTPATIENT
Start: 2019-10-09 | End: 2019-10-10

## 2019-10-09 RX ORDER — SENNA PLUS 8.6 MG/1
1 TABLET ORAL DAILY
Refills: 0 | Status: DISCONTINUED | OUTPATIENT
Start: 2019-10-09 | End: 2019-10-10

## 2019-10-09 RX ORDER — WARFARIN SODIUM 2.5 MG/1
5 TABLET ORAL ONCE
Refills: 0 | Status: COMPLETED | OUTPATIENT
Start: 2019-10-09 | End: 2019-10-09

## 2019-10-09 RX ORDER — DOCUSATE SODIUM 100 MG
100 CAPSULE ORAL
Refills: 0 | Status: DISCONTINUED | OUTPATIENT
Start: 2019-10-09 | End: 2019-10-10

## 2019-10-09 RX ADMIN — LISINOPRIL 10 MILLIGRAM(S): 2.5 TABLET ORAL at 05:54

## 2019-10-09 RX ADMIN — Medication 650 MILLIGRAM(S): at 16:50

## 2019-10-09 RX ADMIN — Medication 650 MILLIGRAM(S): at 03:43

## 2019-10-09 RX ADMIN — Medication 325 MILLIGRAM(S): at 11:42

## 2019-10-09 RX ADMIN — Medication 100 MILLIGRAM(S): at 21:27

## 2019-10-09 RX ADMIN — Medication 650 MILLIGRAM(S): at 04:43

## 2019-10-09 RX ADMIN — HEPARIN SODIUM 1100 UNIT(S)/HR: 5000 INJECTION INTRAVENOUS; SUBCUTANEOUS at 11:42

## 2019-10-09 RX ADMIN — GABAPENTIN 100 MILLIGRAM(S): 400 CAPSULE ORAL at 05:54

## 2019-10-09 RX ADMIN — GABAPENTIN 100 MILLIGRAM(S): 400 CAPSULE ORAL at 18:00

## 2019-10-09 RX ADMIN — LACTULOSE 10 GRAM(S): 10 SOLUTION ORAL at 05:54

## 2019-10-09 RX ADMIN — HEPARIN SODIUM 1400 UNIT(S)/HR: 5000 INJECTION INTRAVENOUS; SUBCUTANEOUS at 00:01

## 2019-10-09 RX ADMIN — LACTULOSE 10 GRAM(S): 10 SOLUTION ORAL at 18:01

## 2019-10-09 RX ADMIN — SODIUM CHLORIDE 75 MILLILITER(S): 9 INJECTION, SOLUTION INTRAVENOUS at 18:10

## 2019-10-09 RX ADMIN — SODIUM CHLORIDE 75 MILLILITER(S): 9 INJECTION, SOLUTION INTRAVENOUS at 03:43

## 2019-10-09 RX ADMIN — HEPARIN SODIUM 0 UNIT(S)/HR: 5000 INJECTION INTRAVENOUS; SUBCUTANEOUS at 10:26

## 2019-10-09 RX ADMIN — CLOTRIMAZOLE AND BETAMETHASONE DIPROPIONATE 1 APPLICATION(S): 10; .5 CREAM TOPICAL at 18:01

## 2019-10-09 RX ADMIN — Medication 650 MILLIGRAM(S): at 15:48

## 2019-10-09 RX ADMIN — SENNA PLUS 1 TABLET(S): 8.6 TABLET ORAL at 21:27

## 2019-10-09 RX ADMIN — WARFARIN SODIUM 5 MILLIGRAM(S): 2.5 TABLET ORAL at 21:32

## 2019-10-09 NOTE — CHART NOTE - NSCHARTNOTEFT_GEN_A_CORE
Night float post op check    Pt s/p D&C  c/o chronic LLE pain, otherwise feels well.  tolerated diet, +UO  RN notes hematuria although primafit cannister empty; +blood noted in tubing.    ICU Vital Signs Last 24 Hrs  T(C): 36.8 (08 Oct 2019 21:40), Max: 36.9 (08 Oct 2019 03:53)  T(F): 98.3 (08 Oct 2019 21:40), Max: 98.4 (08 Oct 2019 03:53)  HR: 89 (08 Oct 2019 21:40) (78 - 89)  BP: 126/67 (08 Oct 2019 21:40) (112/65 - 150/76)  RR: 18 (08 Oct 2019 21:40) (14 - 19)  SpO2: 96% (08 Oct 2019 21:40) (96% - 100%)  abd: soft, ntnd  ext: +boots bilat LE, +LUE weakness  : +primafit in place, no bleeding noted    - continue mgt per surgery & primary team

## 2019-10-10 ENCOUNTER — TRANSCRIPTION ENCOUNTER (OUTPATIENT)
Age: 69
End: 2019-10-10

## 2019-10-10 VITALS
OXYGEN SATURATION: 97 % | DIASTOLIC BLOOD PRESSURE: 78 MMHG | HEART RATE: 90 BPM | RESPIRATION RATE: 17 BRPM | TEMPERATURE: 98 F | SYSTOLIC BLOOD PRESSURE: 134 MMHG

## 2019-10-10 LAB
ALBUMIN SERPL ELPH-MCNC: 2.9 G/DL — LOW (ref 3.3–5)
ALP SERPL-CCNC: 134 U/L — HIGH (ref 40–120)
ALT FLD-CCNC: 41 U/L — SIGNIFICANT CHANGE UP (ref 12–78)
ANION GAP SERPL CALC-SCNC: 5 MMOL/L — SIGNIFICANT CHANGE UP (ref 5–17)
AST SERPL-CCNC: 27 U/L — SIGNIFICANT CHANGE UP (ref 15–37)
BILIRUB SERPL-MCNC: 0.4 MG/DL — SIGNIFICANT CHANGE UP (ref 0.2–1.2)
BUN SERPL-MCNC: 7 MG/DL — SIGNIFICANT CHANGE UP (ref 7–23)
CALCIUM SERPL-MCNC: 10.8 MG/DL — HIGH (ref 8.5–10.1)
CHLORIDE SERPL-SCNC: 109 MMOL/L — HIGH (ref 96–108)
CO2 SERPL-SCNC: 28 MMOL/L — SIGNIFICANT CHANGE UP (ref 22–31)
CREAT SERPL-MCNC: 0.69 MG/DL — SIGNIFICANT CHANGE UP (ref 0.5–1.3)
GLUCOSE SERPL-MCNC: 98 MG/DL — SIGNIFICANT CHANGE UP (ref 70–99)
HCT VFR BLD CALC: 32.1 % — LOW (ref 34.5–45)
HGB BLD-MCNC: 9.3 G/DL — LOW (ref 11.5–15.5)
INR BLD: 1.2 RATIO — HIGH (ref 0.88–1.16)
MCHC RBC-ENTMCNC: 22.1 PG — LOW (ref 27–34)
MCHC RBC-ENTMCNC: 29 GM/DL — LOW (ref 32–36)
MCV RBC AUTO: 76.2 FL — LOW (ref 80–100)
NRBC # BLD: 0 /100 WBCS — SIGNIFICANT CHANGE UP (ref 0–0)
PLATELET # BLD AUTO: 354 K/UL — SIGNIFICANT CHANGE UP (ref 150–400)
POTASSIUM SERPL-MCNC: 3.9 MMOL/L — SIGNIFICANT CHANGE UP (ref 3.5–5.3)
POTASSIUM SERPL-SCNC: 3.9 MMOL/L — SIGNIFICANT CHANGE UP (ref 3.5–5.3)
PROT SERPL-MCNC: 7.1 GM/DL — SIGNIFICANT CHANGE UP (ref 6–8.3)
PROTHROM AB SERPL-ACNC: 13.5 SEC — HIGH (ref 10–12.9)
RBC # BLD: 4.21 M/UL — SIGNIFICANT CHANGE UP (ref 3.8–5.2)
RBC # FLD: 19.8 % — HIGH (ref 10.3–14.5)
SODIUM SERPL-SCNC: 142 MMOL/L — SIGNIFICANT CHANGE UP (ref 135–145)
WBC # BLD: 10.06 K/UL — SIGNIFICANT CHANGE UP (ref 3.8–10.5)
WBC # FLD AUTO: 10.06 K/UL — SIGNIFICANT CHANGE UP (ref 3.8–10.5)

## 2019-10-10 PROCEDURE — 99239 HOSP IP/OBS DSCHRG MGMT >30: CPT

## 2019-10-10 RX ORDER — DOCUSATE SODIUM 100 MG
1 CAPSULE ORAL
Qty: 0 | Refills: 0 | DISCHARGE
Start: 2019-10-10

## 2019-10-10 RX ORDER — SENNA PLUS 8.6 MG/1
1 TABLET ORAL
Qty: 0 | Refills: 0 | DISCHARGE
Start: 2019-10-10

## 2019-10-10 RX ORDER — LISINOPRIL/HYDROCHLOROTHIAZIDE 10-12.5 MG
1 TABLET ORAL
Qty: 0 | Refills: 0 | DISCHARGE

## 2019-10-10 RX ORDER — ACETAMINOPHEN 500 MG
2 TABLET ORAL
Qty: 0 | Refills: 0 | DISCHARGE
Start: 2019-10-10

## 2019-10-10 RX ORDER — LISINOPRIL 2.5 MG/1
1 TABLET ORAL
Qty: 0 | Refills: 0 | DISCHARGE
Start: 2019-10-10

## 2019-10-10 RX ORDER — FERROUS SULFATE 325(65) MG
1 TABLET ORAL
Qty: 0 | Refills: 0 | DISCHARGE
Start: 2019-10-10

## 2019-10-10 RX ADMIN — LACTULOSE 10 GRAM(S): 10 SOLUTION ORAL at 07:15

## 2019-10-10 RX ADMIN — SODIUM CHLORIDE 75 MILLILITER(S): 9 INJECTION, SOLUTION INTRAVENOUS at 07:16

## 2019-10-10 RX ADMIN — CLOTRIMAZOLE AND BETAMETHASONE DIPROPIONATE 1 APPLICATION(S): 10; .5 CREAM TOPICAL at 05:53

## 2019-10-10 RX ADMIN — LISINOPRIL 10 MILLIGRAM(S): 2.5 TABLET ORAL at 05:53

## 2019-10-10 RX ADMIN — Medication 325 MILLIGRAM(S): at 11:29

## 2019-10-10 RX ADMIN — GABAPENTIN 100 MILLIGRAM(S): 400 CAPSULE ORAL at 05:53

## 2019-10-10 NOTE — PROGRESS NOTE ADULT - SUBJECTIVE AND OBJECTIVE BOX
HPI:  68 year old female PMH CVA w left hemiparesis, HTN, HL brought by ems with daughter c/o bilateral legs swelling and pain L>R for last couple of days. Pt has a hx of CVA with left side weakness and pt is bed bound. Pt is taking coumadin and dose had been increased two days ago. Pt denies recent hx of trauma, headache, dizziness, blurred visions, light sensitivity, focal/distal weakness or numbness, cough, sob, chest pain, nausea, vomiting, fever, chills, abd pain, dysuria or irregular bowel movements. (24 Sep 2019 02:24)      SUBJECTIVE & OBJECTIVE: Pt seen and examined at bedside.   C/o Left leg pain -states improved   Denies fever, chills, N/V, dizziness, HA, cough, CP, palpitations, SOB, abdominal pain, dysuria, diarrhea, constipation.     PHYSICAL EXAM:  Vitals noted.       GENERAL: NAD  HEAD:  Atraumatic, Normocephalic  EYES: EOMI, PERRLA, conjunctiva and sclera clear  ENMT: Moist mucous membranes  NECK: Supple, No JVD  NERVOUS SYSTEM:  Alert & Oriented X3, strength 4/5 right side, 1-2/5 left side. clear speech   CHEST/LUNG: Clear to auscultation bilaterally; No rales, rhonchi, wheezing, or rubs  HEART: Regular rate and rhythm; No murmurs, rubs, or gallops  ABDOMEN: Soft, Nontender, Nondistended; Bowel sounds present  EXTREMITIES:  left thigh tense to palpation, no erythema or hematoma noted. limited ROM as pain on movement of LLE. Swelling of left ankle and foot noted, no erythma or pain on moving the ankle or palpation of the foot. Unable to palpate left pulse due to swelling. Right side 2+ peripheral pulses.   No cyanosis b/l.     MEDICATIONS  (STANDING):  atorvastatin 20 milliGRAM(s) Oral at bedtime  gabapentin 100 milliGRAM(s) Oral two times a day  influenza   Vaccine 0.5 milliLiter(s) IntraMuscular once  lisinopril 10 milliGRAM(s) Oral daily  nystatin Powder 1 Application(s) Topical two times a day  sodium chloride 0.9%. 1000 milliLiter(s) (85 mL/Hr) IV Continuous <Continuous>    MEDICATIONS  (PRN):  acetaminophen   Tablet .. 650 milliGRAM(s) Oral every 6 hours PRN Moderate Pain (4 - 6)      Labs and imaging reviewed.                                           8.7    7.36  )-----------( 232      ( 26 Sep 2019 06:06 )             29.8       143  |  112<H>  |  12  ----------------------------<  96  4.3   |  28  |  0.59    Ca    9.0      26 Sep 2019 06:06  Phos  2.0       Mg     2.0                PTT - ( 23 Sep 2019 17:38 )  PTT:42.3 sec  Urinalysis Basic - ( 24 Sep 2019 03:32 )    Color: Yellow / Appearance: Clear / S.010 / pH: x  Gluc: x / Ketone: Trace  / Bili: Negative / Urobili: Negative mg/dL   Blood: x / Protein: Negative mg/dL / Nitrite: Negative   Leuk Esterase: Negative / RBC: x / WBC 0-2   Sq Epi: x / Non Sq Epi: Occasional / Bacteria: Negative        CARDIAC MARKERS ( 24 Sep 2019 16:41 )  x     / x     / 2736 U/L / x     / x            RECENT CULTURES:      RADIOLOGY & ADDITIONAL TESTS:  < from: CT Lower Extremity w/ IV Cont, Left (19 @ 17:32) >  Impression: No evidence of active bleeding or hematoma. Abnormal uterine   enhancement which could indicate uterine malignancy. Clinical correlation   is suggested.    < end of copied text >    Imaging Personally Reviewed:  [x ] YES  [ ] NO    Consultant(s) Notes Reviewed:  [x ] YES  [ ] NO    Care Discussed with Consultants/Other Providers [ x] YES  [ ] NO    Care discussed in detail with patient.  All questions and concerns addressed
Patient is a 68y old  Female who presents with a chief complaint of Leg pain. (30 Sep 2019 10:18), no chest pain.    OVERNIGHT EVENTS: none    MEDICATIONS  (STANDING):  atorvastatin 20 milliGRAM(s) Oral at bedtime  ferrous    sulfate 325 milliGRAM(s) Oral daily  gabapentin 100 milliGRAM(s) Oral two times a day  influenza   Vaccine 0.5 milliLiter(s) IntraMuscular once  lisinopril 10 milliGRAM(s) Oral daily  nystatin Powder 1 Application(s) Topical two times a day  sodium chloride 0.9%. 1000 milliLiter(s) (85 mL/Hr) IV Continuous <Continuous>    MEDICATIONS  (PRN):  acetaminophen   Tablet .. 650 milliGRAM(s) Oral every 6 hours PRN Moderate Pain (4 - 6)  traMADol 50 milliGRAM(s) Oral every 6 hours PRN Severe Pain (7 - 10)      REVIEW OF SYSTEMS:  CONSTITUTIONAL: No fever,   EYES: No eye pain,    ENMT:  No difficulty hearing,    NECK: No pain or stiffness  RESPIRATORY: No cough,   CARDIOVASCULAR: No chest pain, palpitations, dizziness, or leg swelling  GASTROINTESTINAL: No abdominal or epigastric pain.    GENITOURINARY: No dysuria, frequency, hematuria, or incontinence  NEUROLOGICAL: No headaches,   SKIN: No rash     Vital Signs Last 24 Hrs  T(C): 36.2 (30 Sep 2019 05:02), Max: 37.1 (29 Sep 2019 17:29)  T(F): 97.1 (30 Sep 2019 05:02), Max: 98.7 (29 Sep 2019 17:29)  HR: 83 (30 Sep 2019 05:02) (83 - 109)  BP: 109/64 (30 Sep 2019 05:02) (100/59 - 135/74)  BP(mean): --  RR: 18 (30 Sep 2019 05:02) (18 - 18)  SpO2: 98% (30 Sep 2019 05:02) (96% - 98%)    PHYSICAL EXAM:  GENERAL: NAD, well-groomed, well-developed  HEAD:  Atraumatic, Normocephalic  EYES: EOMI, PERRLA, conjunctiva and sclera clear  ENMT: No tonsillar erythema, exudates, or enlargement; Moist mucous membranes   NECK: Supple, No JVD   NERVOUS SYSTEM:  Alert & Oriented X 3, left-sided hemiparesis.  CHEST/LUNG: Clear to auscultation  bilaterally; No rales, rhonchi, wheezing, or rubs  HEART: Regular rate and rhythm; No murmurs, rubs, or gallops  ABDOMEN: Soft, NT, ND; Bowel sounds present  EXTREMITIES:  2+ Peripheral Pulses, bilateral leg edema.  LYMPH: No lymphadenopathy noted  SKIN: No petechiae    LABS:      PT/INR - ( 30 Sep 2019 07:34 )   PT: 25.8 sec;   INR: 2.25 ratio            cardiac markers     CAPILLARY BLOOD GLUCOSE        Cultures    RADIOLOGY & ADDITIONAL TESTS:    Imaging Personally Reviewed:  [ ] YES  [ ] NO    Consultant(s) Notes Reviewed:  [x ] YES  [ ] NO    Care Discussed with Consultants/Other Providers [ ] YES  [ ] NO
Surgery NP note  Patient seen and examined bedside resting comfortably.  No complaints offered. Abdominal pain is well controlled.  Denies nausea and vomiting. Tolerating diet.  Normal flatus/BM.     T(F): 97.6 (09-25-19 @ 11:24), Max: 98.8 (09-24-19 @ 18:02)  HR: 101 (09-25-19 @ 11:24) (87 - 105)  BP: 146/786 (09-25-19 @ 11:24) (137/71 - 146/786)  RR: 17 (09-25-19 @ 11:24) (17 - 18)  SpO2: 96% (09-25-19 @ 11:24) (94% - 96%)  Wt(kg): --  CAPILLARY BLOOD GLUCOSE          PHYSICAL EXAM:  General: NAD, WDWN.   Neuro:  Alert & responsive  HEENT: NCAT, EOMI, conjunctiva clear  CV: +S1+S2 regular rate and rhythm  Lung: clear to ausculation bilaterally, respirations nonlabored, good inspiratory effort  Abdomen: soft, Non tender, No Distension. Normal active BS  Extremities: B/L LEs edematous, Left medial thigh >R. Compartments soft, Mildly tender to palpation. Sensation grossly intact b/l, +Left LE weakness. Peripheral pulses palpated b/l.    Skin:  No rash    LABS:                        8.6    8.68  )-----------( 249      ( 25 Sep 2019 06:37 )             29.5     09-25    139  |  104  |  12  ----------------------------<  108<H>  2.9<LL>   |  29  |  0.65    Ca    9.2      25 Sep 2019 06:37  Phos  1.9     09-25  Mg     2.0     09-25    TPro  8.4<H>  /  Alb  3.4  /  TBili  0.4  /  DBili  x   /  AST  33  /  ALT  43  /  AlkPhos  104  09-23    LIVER FUNCTIONS - ( 23 Sep 2019 17:38 )  Alb: 3.4 g/dL / Pro: 8.4 gm/dL / ALK PHOS: 104 U/L / ALT: 43 U/L / AST: 33 U/L / GGT: x           PT/INR - ( 25 Sep 2019 06:37 )   PT: 24.2 sec;   INR: 2.11 ratio         PTT - ( 23 Sep 2019 17:38 )  PTT:42.3 sec  I&O's Detail    24 Sep 2019 07:01  -  25 Sep 2019 07:00  --------------------------------------------------------  IN:    Oral Fluid: 600 mL  Total IN: 600 mL    OUT:    Voided: 500 mL  Total OUT: 500 mL    Total NET: 100 mL      25 Sep 2019 07:01  -  25 Sep 2019 14:10  --------------------------------------------------------  IN:  Total IN: 0 mL    OUT:    Voided: 800 mL  Total OUT: 800 mL    Total NET: -800 mL    < from: CT Lower Extremity w/ IV Cont, Left (09.24.19 @ 17:32) >    EXAM:  CT LWR EXT IC LT                            *** ADDENDUM 09/25/2019  ***                                                               Addendum:    The finding of partially visualized abnormal uterus was discussed by   telephone with the requesting physician on the morning after dictation.   She indicated her plan to obtain formal CT imaging of the abdomen and   pelvis.        *** END OF ADDENDUM 09/25/2019  ***      PROCEDURE DATE:  09/24/2019          INTERPRETATION:  CT of the left lower extremity with contrast    History: Site of bleeding    Technique: Multiple axial scans of the left lower extremity was obtained   from the lower left abdomen to the foot after administration of 90 mL of   Omnipaque 350 intravenous contrast material.    Interpretation: There is subcutaneous edema of the lower leg extending to   the dorsum of the foot. No abnormal fluid collection is identified to   indicate hematoma. The bony structures show no gross destructive changes.    In the pelvis, no abnormal fluid collection is identified to indicate   hematoma. A catheter crosses the visualized pelvis which may be part of a    shunt. There is abnormal enhancement of the uterus which is not   completely imaged.    Impression: No evidence of active bleeding or hematoma. Abnormal uterine   enhancement which could indicate uterine malignancy. Clinical correlation   is suggested.    Thank you for the courtesy of this referral.      ***Please see the addendum at the top of this report. It may contain   additional important information or changes.****      ELSIE VENCES M.D., ATTENDING RADIOLOGIST  This document has been electronically signed. Sep 24 2019  7:35PM  Addend:  ELSIE VENCES M.D., ATTENDING RADIOLOGIST  This addendum was electronically signed on: Sep 25 2019  9:26AM.    A/P:  68 year old female PMH CVA w left hemiparesis (on coumadin), HTN, HLD brought by ems with daughter c/o bilateral LE swelling and pain L>R x2 days. US of LEs NEGATIVE for DVT  Doubt compartment syndrome at this time, r/o Left medial thigh hematoma?    - results of CT of LLE : There is subcutaneous edema of the lower leg extending to   the dorsum of the foot. No abnormal fluid collection is identified to   indicate hematoma  - continue to closely monitor extremity   Warm compresses and elevation   Cont. current medical management per primary team, pain management   f/u AM labs, H/H and INR  Discussed with Dr Dvelin recommend to CT ad and pelvis to r/o mass
CHIEF COMPLAINT/INTERVAL HISTORY:    Patient is a 68y old  Female who presents with a chief complaint of Leg pain. (04 Oct 2019 10:47)      HPI:  68 year old female PMH CVA w left hemiparesis, HTN, HL brought by ems with daughter c/o bilateral legs swelling and pain L>R for last couple of days. Pt has a hx of CVA with left side weakness and pt is bed bound. Pt is taking coumadin and dose had been increased two days ago. Pt denies recent hx of trauma, headache, dizziness, blurred visions, light sensitivity, focal/distal weakness or numbness, cough, sob, chest pain, nausea, vomiting, fever, chills, abd pain, dysuria or irregular bowel movements. (24 Sep 2019 02:24)    Overnight issues  bx canceled because of inr of 2 and tenetaively rescheduled for monday             SUBJECTIVE & OBJECTIVE: Pt seen and examined at bedside.   ROS:  CONSTITUTIONAL: No fever, weight loss, or fatigue  NECK: No pain or stiffness  RESPIRATORY: No cough, wheezing, chills or hemoptysis; No shortness of breath  CARDIOVASCULAR: No chest pain, palpitations, dizziness, or leg swelling  GASTROINTESTINAL: No abdominal or epigastric pain. No nausea, vomiting, or hematemesis; No diarrhea or constipation. No melena or hematochezia.  GENITOURINARY: No dysuria, frequency, hematuria, or incontinence  NEUROLOGICAL: No headaches, memory loss, loss of strength, numbness, or tremors  SKIN: No itching, burning, rashes, or lesions   ICU Vital Signs Last 24 Hrs  T(C): 36.1 (04 Oct 2019 06:02), Max: 36.3 (03 Oct 2019 23:30)  T(F): 97 (04 Oct 2019 06:02), Max: 97.4 (03 Oct 2019 23:30)  HR: 90 (04 Oct 2019 06:02) (86 - 94)  BP: 110/70 (04 Oct 2019 06:02) (109/76 - 122/87)  BP(mean): --  ABP: --  ABP(mean): --  RR: 18 (04 Oct 2019 06:02) (18 - 18)  SpO2: 98% (04 Oct 2019 06:02) (98% - 98%)        MEDICATIONS  (STANDING):  atorvastatin 20 milliGRAM(s) Oral at bedtime  ferrous    sulfate 325 milliGRAM(s) Oral daily  gabapentin 100 milliGRAM(s) Oral two times a day  heparin  Infusion.  Unit(s)/Hr (14 mL/Hr) IV Continuous <Continuous>  influenza   Vaccine 0.5 milliLiter(s) IntraMuscular once  lisinopril 10 milliGRAM(s) Oral daily  nystatin Powder 1 Application(s) Topical two times a day  sodium chloride 0.9%. 1000 milliLiter(s) (85 mL/Hr) IV Continuous <Continuous>    MEDICATIONS  (PRN):  acetaminophen   Tablet .. 650 milliGRAM(s) Oral every 6 hours PRN Moderate Pain (4 - 6)  heparin  Injectable 6500 Unit(s) IV Push every 6 hours PRN For aPTT less than 40  heparin  Injectable 3000 Unit(s) IV Push every 6 hours PRN For aPTT between 40 - 57        PHYSICAL EXAM:    GENERAL: NAD, well-groomed, well-developed  HEAD:  Atraumatic, Normocephalic  EYES: EOMI, PERRLA, conjunctiva and sclera clear  ENMT: Moist mucous membranes  NECK: Supple, No JVD  NERVOUS SYSTEM:  Alert & Oriented X3, Motor Strength 5/5 B/L upper and lower extremities; DTRs 2+ intact and symmetric  CHEST/LUNG: Clear to auscultation bilaterally; No rales, rhonchi, wheezing, or rubs  HEART: Regular rate and rhythm; No murmurs, rubs, or gallops  ABDOMEN: Soft, Nontender, Nondistended; Bowel sounds present  EXTREMITIES:  2+ Peripheral Pulses, No clubbing, cyanosis, or edema    LABS:                        10.4   11.76 )-----------( 364      ( 04 Oct 2019 07:30 )             35.7     10-04    137  |  104  |  13  ----------------------------<  97  3.8   |  25  |  0.71    Ca    9.8      04 Oct 2019 07:30  Phos  3.1     10-04  Mg     2.4     10-04      PT/INR - ( 04 Oct 2019 07:30 )   PT: 22.9 sec;   INR: 2.00 ratio         PTT - ( 04 Oct 2019 07:30 )  PTT:82.2 sec      CAPILLARY BLOOD GLUCOSE          RECENT CULTURES:      RADIOLOGY & ADDITIONAL TESTS:  Imaging Personally Reviewed:  [ ] YES      Consultant(s) Notes Reviewed:  [ ] YES     Care Discussed with [ ] Consultants [X ] Patient [ ] Family  [x ]    [x ]  Other; RN  HEALTH ISSUES - PROBLEM Dx:  Dermoid cyst: Dermoid cyst  Anemia: Anemia  Other iron deficiency anemia: Other iron deficiency anemia  Uterine mass: Uterine mass  Electrolyte abnormality: Electrolyte abnormality  Preventive measure: Preventive measure  Functional quadriplegia: Functional quadriplegia  Non-traumatic rhabdomyolysis: Non-traumatic rhabdomyolysis  Essential hypertension: Essential hypertension  Left hemiparesis: Left hemiparesis  Leg pain, diffuse, left: Leg pain, diffuse, left        DVT/GI ppx  Discussed with pt @ bedside
CHIEF COMPLAINT/INTERVAL HISTORY:    Patient is a 68y old  Female who presents with a chief complaint of Leg pain. (04 Oct 2019 10:47)      HPI:  68 year old female PMH CVA w left hemiparesis, HTN, HL brought by ems with daughter c/o bilateral legs swelling and pain L>R for last couple of days. Pt has a hx of CVA with left side weakness and pt is bed bound. Pt is taking coumadin and dose had been increased two days ago. Pt denies recent hx of trauma, headache, dizziness, blurred visions, light sensitivity, focal/distal weakness or numbness, cough, sob, chest pain, nausea, vomiting, fever, chills, abd pain, dysuria or irregular bowel movements. (24 Sep 2019 02:24)    Overnight issues none             SUBJECTIVE & OBJECTIVE: Pt seen and examined at bedside.   ROS:  CONSTITUTIONAL: No fever, weight loss, or fatigue  NECK: No pain or stiffness  RESPIRATORY: No cough, wheezing, chills or hemoptysis; No shortness of breath  CARDIOVASCULAR: No chest pain, palpitations, dizziness, or leg swelling  GASTROINTESTINAL: No abdominal or epigastric pain. No nausea, vomiting, or hematemesis; No diarrhea or constipation. No melena or hematochezia.  GENITOURINARY: No dysuria, frequency, hematuria, or incontinence  NEUROLOGICAL: No headaches, memory loss, loss of strength, numbness, or tremors  SKIN: No itching, burning, rashes, or lesions           MEDICATIONS  (STANDING):  atorvastatin 20 milliGRAM(s) Oral at bedtime  ferrous    sulfate 325 milliGRAM(s) Oral daily  gabapentin 100 milliGRAM(s) Oral two times a day  heparin  Infusion.  Unit(s)/Hr (14 mL/Hr) IV Continuous <Continuous>  influenza   Vaccine 0.5 milliLiter(s) IntraMuscular once  lisinopril 10 milliGRAM(s) Oral daily  nystatin Powder 1 Application(s) Topical two times a day  sodium chloride 0.9%. 1000 milliLiter(s) (85 mL/Hr) IV Continuous <Continuous>    MEDICATIONS  (PRN):  acetaminophen   Tablet .. 650 milliGRAM(s) Oral every 6 hours PRN Moderate Pain (4 - 6)  heparin  Injectable 6500 Unit(s) IV Push every 6 hours PRN For aPTT less than 40  heparin  Injectable 3000 Unit(s) IV Push every 6 hours PRN For aPTT between 40 - 57    Vital Signs Last 24 Hrs  T(C): 36.1 (06 Oct 2019 11:43), Max: 37.1 (05 Oct 2019 16:50)  T(F): 97 (06 Oct 2019 11:43), Max: 98.7 (05 Oct 2019 16:50)  HR: 93 (06 Oct 2019 11:43) (87 - 94)  BP: 117/61 (06 Oct 2019 11:43) (117/61 - 136/75)  BP(mean): --  RR: 16 (06 Oct 2019 11:43) (16 - 18)  SpO2: 100% (06 Oct 2019 11:43) (96% - 100%)  PHYSICAL EXAM:    GENERAL: NAD, well-groomed, well-developed  HEAD:  Atraumatic, Normocephalic  EYES: EOMI, PERRLA, conjunctiva and sclera clear  ENMT: Moist mucous membranes  NECK: Supple, No JVD  NERVOUS SYSTEM:  Alert & Oriented X3, Motor Strength 5/5 B/L upper and lower extremities; DTRs 2+ intact and symmetric  CHEST/LUNG: Clear to auscultation bilaterally; No rales, rhonchi, wheezing, or rubs  HEART: Regular rate and rhythm; No murmurs, rubs, or gallops  ABDOMEN: Soft, Nontender, Nondistended; Bowel sounds present  EXTREMITIES:  2+ Peripheral Pulses, No clubbing, cyanosis, or edema                                     9.6    9.39  )-----------( 422      ( 06 Oct 2019 08:19 )             33.2     10-06    140  |  105  |  9   ----------------------------<  101<H>  3.7   |  28  |  0.70    Ca    10.2<H>      06 Oct 2019 08:19  Phos  3.0     10-06  Mg     2.4     10-06      PT/INR - ( 06 Oct 2019 08:19 )   PT: 14.4 sec;   INR: 1.28 ratio         PTT - ( 06 Oct 2019 08:19 )  PTT:80.4 sec                RECENT CULTURES:      RADIOLOGY & ADDITIONAL TESTS:  Imaging Personally Reviewed:  [ ] YES      Consultant(s) Notes Reviewed:  [ ] YES     Care Discussed with [ ] Consultants [X ] Patient [ ] Family  [x ]    [x ]  Other; RN  HEALTH ISSUES - PROBLEM Dx:  Dermoid cyst: Dermoid cyst  Anemia: Anemia  Other iron deficiency anemia: Other iron deficiency anemia  Uterine mass: Uterine mass  Electrolyte abnormality: Electrolyte abnormality  Preventive measure: Preventive measure  Functional quadriplegia: Functional quadriplegia  Non-traumatic rhabdomyolysis: Non-traumatic rhabdomyolysis  Essential hypertension: Essential hypertension  Left hemiparesis: Left hemiparesis  Leg pain, diffuse, left: Leg pain, diffuse, left        DVT/GI ppx  Discussed with pt @ bedside
CHIEF COMPLAINT/INTERVAL HISTORY:    Patient is a 68y old  Female who presents with a chief complaint of Leg pain. (04 Oct 2019 10:47)      HPI:  68 year old female PMH CVA w left hemiparesis, HTN, HL brought by ems with daughter c/o bilateral legs swelling and pain L>R for last couple of days. Pt has a hx of CVA with left side weakness and pt is bed bound. Pt is taking coumadin and dose had been increased two days ago. Pt denies recent hx of trauma, headache, dizziness, blurred visions, light sensitivity, focal/distal weakness or numbness, cough, sob, chest pain, nausea, vomiting, fever, chills, abd pain, dysuria or irregular bowel movements. (24 Sep 2019 02:24)    Overnight issues none   bx canceled because of inr of 2 and tentatively rescheduled for Monday             SUBJECTIVE & OBJECTIVE: Pt seen and examined at bedside.   ROS:  CONSTITUTIONAL: No fever, weight loss, or fatigue  NECK: No pain or stiffness  RESPIRATORY: No cough, wheezing, chills or hemoptysis; No shortness of breath  CARDIOVASCULAR: No chest pain, palpitations, dizziness, or leg swelling  GASTROINTESTINAL: No abdominal or epigastric pain. No nausea, vomiting, or hematemesis; No diarrhea or constipation. No melena or hematochezia.  GENITOURINARY: No dysuria, frequency, hematuria, or incontinence  NEUROLOGICAL: No headaches, memory loss, loss of strength, numbness, or tremors  SKIN: No itching, burning, rashes, or lesions           MEDICATIONS  (STANDING):  atorvastatin 20 milliGRAM(s) Oral at bedtime  ferrous    sulfate 325 milliGRAM(s) Oral daily  gabapentin 100 milliGRAM(s) Oral two times a day  heparin  Infusion.  Unit(s)/Hr (14 mL/Hr) IV Continuous <Continuous>  influenza   Vaccine 0.5 milliLiter(s) IntraMuscular once  lisinopril 10 milliGRAM(s) Oral daily  nystatin Powder 1 Application(s) Topical two times a day  sodium chloride 0.9%. 1000 milliLiter(s) (85 mL/Hr) IV Continuous <Continuous>    MEDICATIONS  (PRN):  acetaminophen   Tablet .. 650 milliGRAM(s) Oral every 6 hours PRN Moderate Pain (4 - 6)  heparin  Injectable 6500 Unit(s) IV Push every 6 hours PRN For aPTT less than 40  heparin  Injectable 3000 Unit(s) IV Push every 6 hours PRN For aPTT between 40 - 57    Vital Signs Last 24 Hrs  T(C): 36.7 (05 Oct 2019 11:37), Max: 36.8 (04 Oct 2019 17:18)  T(F): 98 (05 Oct 2019 11:37), Max: 98.2 (04 Oct 2019 17:18)  HR: 88 (05 Oct 2019 11:37) (79 - 97)  BP: 141/82 (05 Oct 2019 11:37) (123/53 - 158/71)  BP(mean): --  RR: 17 (05 Oct 2019 11:37) (16 - 18)  SpO2: 98% (05 Oct 2019 11:37) (95% - 99%)    PHYSICAL EXAM:    GENERAL: NAD, well-groomed, well-developed  HEAD:  Atraumatic, Normocephalic  EYES: EOMI, PERRLA, conjunctiva and sclera clear  ENMT: Moist mucous membranes  NECK: Supple, No JVD  NERVOUS SYSTEM:  Alert & Oriented X3, Motor Strength 5/5 B/L upper and lower extremities; DTRs 2+ intact and symmetric  CHEST/LUNG: Clear to auscultation bilaterally; No rales, rhonchi, wheezing, or rubs  HEART: Regular rate and rhythm; No murmurs, rubs, or gallops  ABDOMEN: Soft, Nontender, Nondistended; Bowel sounds present  EXTREMITIES:  2+ Peripheral Pulses, No clubbing, cyanosis, or edema                          9.4    10.02 )-----------( 358      ( 05 Oct 2019 02:06 )             31.6     10-05    140  |  104  |  11  ----------------------------<  103<H>  3.7   |  28  |  0.68    Ca    10.0      05 Oct 2019 02:06  Phos  3.1     10-04  Mg     2.4     10-04      PT/INR - ( 05 Oct 2019 02:06 )   PT: 21.2 sec;   INR: 1.86 ratio         PTT - ( 05 Oct 2019 10:48 )  PTT:63.2 sec              RECENT CULTURES:      RADIOLOGY & ADDITIONAL TESTS:  Imaging Personally Reviewed:  [ ] YES      Consultant(s) Notes Reviewed:  [ ] YES     Care Discussed with [ ] Consultants [X ] Patient [ ] Family  [x ]    [x ]  Other; RN  HEALTH ISSUES - PROBLEM Dx:  Dermoid cyst: Dermoid cyst  Anemia: Anemia  Other iron deficiency anemia: Other iron deficiency anemia  Uterine mass: Uterine mass  Electrolyte abnormality: Electrolyte abnormality  Preventive measure: Preventive measure  Functional quadriplegia: Functional quadriplegia  Non-traumatic rhabdomyolysis: Non-traumatic rhabdomyolysis  Essential hypertension: Essential hypertension  Left hemiparesis: Left hemiparesis  Leg pain, diffuse, left: Leg pain, diffuse, left        DVT/GI ppx  Discussed with pt @ bedside
HPI:  68 year old female PMH CVA w left hemiparesis, HTN, HL brought by ems with daughter c/o bilateral legs swelling and pain L>R for last couple of days. Pt has a hx of CVA with left side weakness and pt is bed bound. Pt is taking coumadin and dose had been increased two days ago. Pt denies recent hx of trauma, headache, dizziness, blurred visions, light sensitivity, focal/distal weakness or numbness, cough, sob, chest pain, nausea, vomiting, fever, chills, abd pain, dysuria or irregular bowel movements. (24 Sep 2019 02:24)      Patient is a 68y old  Female who presents with a chief complaint of Leg pain. (08 Oct 2019 09:47)      INTERVAL HPI/OVERNIGHT EVENTS: s/p endometrial biopsy     MEDICATIONS  (STANDING):  ferrous    sulfate 325 milliGRAM(s) Oral daily  gabapentin 100 milliGRAM(s) Oral two times a day  heparin  Infusion. 1100 Unit(s)/Hr (14 mL/Hr) IV Continuous <Continuous>  influenza   Vaccine 0.5 milliLiter(s) IntraMuscular once  lactated ringers. 1000 milliLiter(s) (75 mL/Hr) IV Continuous <Continuous>  lisinopril 10 milliGRAM(s) Oral daily  warfarin 5 milliGRAM(s) Oral once    MEDICATIONS  (PRN):  acetaminophen   Tablet .. 650 milliGRAM(s) Oral every 6 hours PRN Moderate Pain (4 - 6)  fentaNYL    Injectable 25 MICROGram(s) IV Push every 5 minutes PRN Moderate Pain (4 - 6)  heparin  Injectable 6500 Unit(s) IV Push every 6 hours PRN For aPTT less than 40  heparin  Injectable 3000 Unit(s) IV Push every 6 hours PRN For aPTT between 40 - 57      Allergies    No Known Allergies    Intolerances        REVIEW OF SYSTEMS:  CONSTITUTIONAL: No fever, weight loss, or fatigue  EYES: No eye pain, visual disturbances, or discharge  ENMT:  No difficulty hearing, tinnitus, vertigo; No sinus or throat pain  NECK: No pain or stiffness  BREASTS: No pain, masses, or nipple discharge  RESPIRATORY: No cough, wheezing, chills or hemoptysis; No shortness of breath  CARDIOVASCULAR: No chest pain, palpitations, dizziness, or leg swelling  GASTROINTESTINAL: No abdominal or epigastric pain. No nausea, vomiting, or hematemesis; No diarrhea or constipation. No melena or hematochezia.  GENITOURINARY: No dysuria, frequency, hematuria, or incontinence  NEUROLOGICAL: No headaches, memory loss, loss of strength, numbness, or tremors  SKIN: No itching, burning, rashes, or lesions   LYMPH NODES: No enlarged glands  ENDOCRINE: No heat or cold intolerance; No hair loss  MUSCULOSKELETAL: No joint pain or swelling; No muscle, back, or extremity pain  PSYCHIATRIC: No depression, anxiety, mood swings, or difficulty sleeping  HEME/LYMPH: No easy bruising, or bleeding gums  ALLERGY AND IMMUNOLOGIC: No hives or eczema    Vital Signs Last 24 Hrs  T(C): 36.1 (08 Oct 2019 15:45), Max: 36.9 (08 Oct 2019 03:53)  T(F): 96.9 (08 Oct 2019 15:45), Max: 98.4 (08 Oct 2019 03:53)  HR: 89 (08 Oct 2019 16:45) (78 - 89)  BP: 142/80 (08 Oct 2019 16:45) (112/65 - 150/76)  BP(mean): --  RR: 18 (08 Oct 2019 16:45) (14 - 19)  SpO2: 99% (08 Oct 2019 16:45) (96% - 100%)    PHYSICAL EXAM:  GENERAL: NAD, well-groomed, well-developed  HEAD:  Atraumatic, Normocephalic  EYES: EOMI, PERRLA, conjunctiva and sclera clear  ENMT: No tonsillar erythema, exudates, or enlargement; Moist mucous membranes, Good dentition, No lesions  NECK: Supple, No JVD, Normal thyroid  NERVOUS SYSTEM:  Alert & Oriented X3, Good concentration left sided weakness   CHEST/LUNG: Clear to percussion bilaterally; No rales, rhonchi, wheezing, or rubs  HEART: Regular rate and rhythm; No murmurs, rubs, or gallops  ABDOMEN: Soft, Nontender, Nondistended; Bowel sounds present  EXTREMITIES:  2+ Peripheral Pulses, No clubbing, cyanosis, or edema  LYMPH: No lymphadenopathy noted  SKIN: No rashes or lesions    LABS:                        9.8    9.13  )-----------( 416      ( 08 Oct 2019 07:16 )             33.6     10-08    138  |  107  |  8   ----------------------------<  94  4.5   |  27  |  0.64    Ca    10.5<H>      08 Oct 2019 07:16  Phos  2.9     10-08  Mg     2.4     10-08      PT/INR - ( 08 Oct 2019 07:16 )   PT: 12.5 sec;   INR: 1.11 ratio         PTT - ( 08 Oct 2019 07:16 )  PTT:42.9 sec    CAPILLARY BLOOD GLUCOSE          RADIOLOGY & ADDITIONAL TESTS:    Imaging Personally Reviewed:  [X ] YES  [ ] NO    Consultant(s) Notes Reviewed:  [ X] YES  [ ] NO    Care Discussed with Consultants/Other Providers [X ] YES  [ ] NO
HPI:  68 year old female PMH CVA w left hemiparesis, HTN, HL brought by ems with daughter c/o bilateral legs swelling and pain L>R for last couple of days. Pt has a hx of CVA with left side weakness and pt is bed bound. Pt is taking coumadin and dose had been increased two days ago. Pt denies recent hx of trauma, headache, dizziness, blurred visions, light sensitivity, focal/distal weakness or numbness, cough, sob, chest pain, nausea, vomiting, fever, chills, abd pain, dysuria or irregular bowel movements. (24 Sep 2019 02:24)      SUBJECTIVE & OBJECTIVE: Pt seen and examined at bedside.   C/o Left leg pain but can't describe where     Denies fever, chills, N/V, dizziness, HA, cough, CP, palpitations, SOB, abdominal pain, dysuria, diarrhea, constipation.     PHYSICAL EXAM:  Vitals noted.       GENERAL: NAD  HEAD:  Atraumatic, Normocephalic  EYES: EOMI, PERRLA, conjunctiva and sclera clear  ENMT: Moist mucous membranes  NECK: Supple, No JVD  NERVOUS SYSTEM:  Alert & Oriented X3, strength 4/5 right side, 1-2/5 left side. clear speech   CHEST/LUNG: Clear to auscultation bilaterally; No rales, rhonchi, wheezing, or rubs  HEART: Regular rate and rhythm; No murmurs, rubs, or gallops  ABDOMEN: Soft, Nontender, Nondistended; Bowel sounds present  EXTREMITIES:  left thigh tense to palpation, no erythema or hematoma noted. limited ROM as pain on movement of LLE. Swelling of left ankle and foot noted, no erythma or pain on moving the ankle or palpation of the foot. Unable to palpate left pulse due to swelling. Right side 2+ peripheral pulses.   No cyanosis b/l.     MEDICATIONS  (STANDING):  atorvastatin 20 milliGRAM(s) Oral at bedtime  gabapentin 100 milliGRAM(s) Oral two times a day  influenza   Vaccine 0.5 milliLiter(s) IntraMuscular once  lisinopril 10 milliGRAM(s) Oral daily  nystatin Powder 1 Application(s) Topical two times a day  sodium chloride 0.9%. 1000 milliLiter(s) (85 mL/Hr) IV Continuous <Continuous>    MEDICATIONS  (PRN):  acetaminophen   Tablet .. 650 milliGRAM(s) Oral every 6 hours PRN Moderate Pain (4 - 6)      Labs and imaging reviewed.                                    9.6    10.09 )-----------( 300      ( 24 Sep 2019 06:15 )             32.4       138  |  100  |  13  ----------------------------<  132<H>  3.5   |  30  |  0.79    Ca    9.8      24 Sep 2019 06:15    TPro  8.4<H>  /  Alb  3.4  /  TBili  0.4  /  DBili  x   /  AST  33  /  ALT  43  /  AlkPhos  104      PT/INR - ( 24 Sep 2019 16:41 )   PT: 26.9 sec;   INR: 2.34 ratio         PTT - ( 23 Sep 2019 17:38 )  PTT:42.3 sec  Urinalysis Basic - ( 24 Sep 2019 03:32 )    Color: Yellow / Appearance: Clear / S.010 / pH: x  Gluc: x / Ketone: Trace  / Bili: Negative / Urobili: Negative mg/dL   Blood: x / Protein: Negative mg/dL / Nitrite: Negative   Leuk Esterase: Negative / RBC: x / WBC 0-2   Sq Epi: x / Non Sq Epi: Occasional / Bacteria: Negative        CARDIAC MARKERS ( 24 Sep 2019 16:41 )  x     / x     / 2736 U/L / x     / x            RECENT CULTURES:      RADIOLOGY & ADDITIONAL TESTS:  Imaging Personally Reviewed:  [x ] YES  [ ] NO    Consultant(s) Notes Reviewed:  [x ] YES  [ ] NO    Care Discussed with Consultants/Other Providers [ x] YES  [ ] NO    Care discussed in detail with patient.  All questions and concerns addressed
HPI:  68 year old female PMH CVA w left hemiparesis, HTN, HL brought by ems with daughter c/o bilateral legs swelling and pain L>R for last couple of days. Pt has a hx of CVA with left side weakness and pt is bed bound. Pt is taking coumadin and dose had been increased two days ago. Pt denies recent hx of trauma, headache, dizziness, blurred visions, light sensitivity, focal/distal weakness or numbness, cough, sob, chest pain, nausea, vomiting, fever, chills, abd pain, dysuria or irregular bowel movements. (24 Sep 2019 02:24)    Patient is a 68y old  Female who presents with a chief complaint of Leg pain. (07 Oct 2019 09:28)      INTERVAL HPI/OVERNIGHT EVENTS: no acute events overnight     MEDICATIONS  (STANDING):  atorvastatin 20 milliGRAM(s) Oral at bedtime  ferrous    sulfate 325 milliGRAM(s) Oral daily  gabapentin 100 milliGRAM(s) Oral two times a day  heparin  Infusion. 1100 Unit(s)/Hr (11 mL/Hr) IV Continuous <Continuous>  influenza   Vaccine 0.5 milliLiter(s) IntraMuscular once  lisinopril 10 milliGRAM(s) Oral daily  nystatin Powder 1 Application(s) Topical two times a day  potassium chloride    Tablet ER 40 milliEquivalent(s) Oral every 4 hours  sodium chloride 0.9%. 1000 milliLiter(s) (85 mL/Hr) IV Continuous <Continuous>    MEDICATIONS  (PRN):  acetaminophen   Tablet .. 650 milliGRAM(s) Oral every 6 hours PRN Moderate Pain (4 - 6)  heparin  Injectable 6500 Unit(s) IV Push every 6 hours PRN For aPTT less than 40  heparin  Injectable 3000 Unit(s) IV Push every 6 hours PRN For aPTT between 40 - 57  traMADol 50 milliGRAM(s) Oral every 6 hours PRN Severe Pain (7 - 10)      Allergies    No Known Allergies    Intolerances        REVIEW OF SYSTEMS:  CONSTITUTIONAL: No fever, weight loss, or fatigue  EYES: No eye pain, visual disturbances, or discharge  ENMT:  No difficulty hearing, tinnitus, vertigo; No sinus or throat pain  NECK: No pain or stiffness  BREASTS: No pain, masses, or nipple discharge  RESPIRATORY: No cough, wheezing, chills or hemoptysis; No shortness of breath  CARDIOVASCULAR: No chest pain, palpitations, dizziness, or leg swelling  GASTROINTESTINAL: No abdominal or epigastric pain. No nausea, vomiting, or hematemesis; No diarrhea or constipation. No melena or hematochezia.  GENITOURINARY: No dysuria, frequency, hematuria, or incontinence  NEUROLOGICAL: No headaches, memory loss, loss of strength, numbness, or tremors  SKIN: No itching, burning, rashes, or lesions   LYMPH NODES: No enlarged glands  ENDOCRINE: No heat or cold intolerance; No hair loss  MUSCULOSKELETAL: No joint pain or swelling; No muscle, back, or extremity pain  PSYCHIATRIC: No depression, anxiety, mood swings, or difficulty sleeping  HEME/LYMPH: No easy bruising, or bleeding gums  ALLERGY AND IMMUNOLOGIC: No hives or eczema    Vital Signs Last 24 Hrs  T(C): 36.6 (07 Oct 2019 11:13), Max: 36.7 (06 Oct 2019 18:23)  T(F): 97.8 (07 Oct 2019 11:13), Max: 98.1 (06 Oct 2019 18:23)  HR: 91 (07 Oct 2019 11:13) (83 - 97)  BP: 122/64 (07 Oct 2019 11:13) (122/64 - 131/71)  BP(mean): --  RR: 17 (07 Oct 2019 11:13) (17 - 18)  SpO2: 98% (07 Oct 2019 11:13) (95% - 98%)    PHYSICAL EXAM:  GENERAL: NAD, well-groomed, well-developed  HEAD:  Atraumatic, Normocephalic  EYES: EOMI, PERRLA, conjunctiva and sclera clear  ENMT: No tonsillar erythema, exudates, or enlargement; Moist mucous membranes, Good dentition, No lesions  NECK: Supple, No JVD, Normal thyroid  NERVOUS SYSTEM:  Alert & Oriented X3, Good concentration; Motor Strength 5/5 B/L upper and lower extremities; DTRs 2+ intact and symmetric  CHEST/LUNG: Clear to percussion bilaterally; No rales, rhonchi, wheezing, or rubs  HEART: Regular rate and rhythm; No murmurs, rubs, or gallops  ABDOMEN: Soft, Nontender, Nondistended; Bowel sounds present  EXTREMITIES:  2+ Peripheral Pulses, No clubbing, cyanosis, or edema  LYMPH: No lymphadenopathy noted  SKIN: No rashes or lesions    LABS:                        9.7    10.07 )-----------( 420      ( 07 Oct 2019 06:12 )             33.2     10-07    142  |  107  |  9   ----------------------------<  105<H>  3.4<L>   |  27  |  0.72    Ca    10.0      07 Oct 2019 06:12  Phos  3.0     10-07  Mg     2.4     10-07      PT/INR - ( 07 Oct 2019 06:12 )   PT: 12.6 sec;   INR: 1.12 ratio         PTT - ( 07 Oct 2019 06:12 )  PTT:66.6 sec    CAPILLARY BLOOD GLUCOSE          RADIOLOGY & ADDITIONAL TESTS:    Imaging Personally Reviewed:  [X ] YES  [ ] NO    Consultant(s) Notes Reviewed:  [ X] YES  [ ] NO    Care Discussed with Consultants/Other Providers [ X] YES  [ ] NO
Patient is a 68y old  Female who presents with a chief complaint of Leg pain. (09 Oct 2019 09:37)      INTERVAL HPI/OVERNIGHT EVENTS:  Pt was seen and examined, no acute events.    MEDICATIONS  (STANDING):  clotrimazole/betamethasone Cream 1 Application(s) Topical two times a day  ferrous    sulfate 325 milliGRAM(s) Oral daily  gabapentin 100 milliGRAM(s) Oral two times a day  influenza   Vaccine 0.5 milliLiter(s) IntraMuscular once  lactated ringers. 1000 milliLiter(s) (75 mL/Hr) IV Continuous <Continuous>  lisinopril 10 milliGRAM(s) Oral daily    MEDICATIONS  (PRN):  acetaminophen   Tablet .. 650 milliGRAM(s) Oral every 6 hours PRN Moderate Pain (4 - 6)  docusate sodium 100 milliGRAM(s) Oral two times a day PRN Constipation  fentaNYL    Injectable 25 MICROGram(s) IV Push every 5 minutes PRN Moderate Pain (4 - 6)  lactulose Syrup 10 Gram(s) Oral two times a day PRN constipation  senna 1 Tablet(s) Oral daily PRN Constipation      Allergies  No Known Allergies      Vital Signs Last 24 Hrs  T(C): 36.6 (10 Oct 2019 13:05), Max: 36.8 (10 Oct 2019 00:00)  T(F): 97.8 (10 Oct 2019 13:05), Max: 98.3 (10 Oct 2019 00:00)  HR: 90 (10 Oct 2019 13:05) (90 - 97)  BP: 134/78 (10 Oct 2019 13:05) (121/63 - 142/72)  BP(mean): --  RR: 17 (10 Oct 2019 13:05) (17 - 18)  SpO2: 97% (10 Oct 2019 13:05) (95% - 99%)    PHYSICAL EXAM:  GENERAL: NAD  HEAD:  Atraumatic  EYES: PERRLA  NERVOUS SYSTEM:  Awake, alert  CHEST/LUNG: Clear  HEART: RRR  ABDOMEN: Soft, non tender  EXTREMITIES:  no edema      LABS:                                   9.3    10.06 )-----------( 354      ( 10 Oct 2019 07:21 )             32.1     10-10    142  |  109<H>  |  7   ----------------------------<  98  3.9   |  28  |  0.69    Ca    10.8<H>      10 Oct 2019 07:21    TPro  7.1  /  Alb  2.9<L>  /  TBili  0.4  /  DBili  x   /  AST  27  /  ALT  41  /  AlkPhos  134<H>  10-10    PT/INR - ( 10 Oct 2019 07:21 )   PT: 13.5 sec;   INR: 1.20 ratio         PTT - ( 09 Oct 2019 17:58 )  PTT:49.0 sec        CAPILLARY BLOOD GLUCOSE          RADIOLOGY & ADDITIONAL TESTS:    Imaging Personally Reviewed:  [ ] YES  [ ] NO    Consultant(s) Notes Reviewed:  [ ] YES  [ ] NO    Care Discussed with Consultants/Other Providers [ ] YES  [ ] NO
Patient is a 68y old  Female who presents with a chief complaint of Leg pain. (09 Oct 2019 09:37)      INTERVAL HPI/OVERNIGHT EVENTS:  Pt was seen and examined, no acute events.    MEDICATIONS  (STANDING):  ferrous    sulfate 325 milliGRAM(s) Oral daily  gabapentin 100 milliGRAM(s) Oral two times a day  influenza   Vaccine 0.5 milliLiter(s) IntraMuscular once  lactated ringers. 1000 milliLiter(s) (75 mL/Hr) IV Continuous <Continuous>  lisinopril 10 milliGRAM(s) Oral daily  warfarin 5 milliGRAM(s) Oral once    MEDICATIONS  (PRN):  acetaminophen   Tablet .. 650 milliGRAM(s) Oral every 6 hours PRN Moderate Pain (4 - 6)  docusate sodium 100 milliGRAM(s) Oral two times a day PRN Constipation  fentaNYL    Injectable 25 MICROGram(s) IV Push every 5 minutes PRN Moderate Pain (4 - 6)  lactulose Syrup 10 Gram(s) Oral two times a day PRN constipation  senna 1 Tablet(s) Oral daily PRN Constipation      Allergies  No Known Allergies      Vital Signs Last 24 Hrs  T(C): 36.5 (09 Oct 2019 11:00), Max: 36.9 (09 Oct 2019 01:51)  T(F): 97.7 (09 Oct 2019 11:00), Max: 98.4 (09 Oct 2019 01:51)  HR: 95 (09 Oct 2019 11:00) (89 - 97)  BP: 119/62 (09 Oct 2019 11:00) (104/67 - 144/72)  BP(mean): --  RR: 18 (09 Oct 2019 11:00) (17 - 18)  SpO2: 97% (09 Oct 2019 11:00) (96% - 99%)    PHYSICAL EXAM:  GENERAL: NAD  HEAD:  Atraumatic  EYES: PERRLA  NERVOUS SYSTEM:  Awake, alert  CHEST/LUNG: Clear  HEART: RRR  ABDOMEN: Soft, non tender  EXTREMITIES:  no edema      LABS:                        9.3    10.08 )-----------( 374      ( 09 Oct 2019 08:05 )             31.9     10-09    140  |  105  |  10  ----------------------------<  100<H>  4.2   |  28  |  0.77    Ca    10.4<H>      09 Oct 2019 08:05  Phos  2.9     10-08  Mg     2.4     10-08      PT/INR - ( 09 Oct 2019 08:05 )   PT: 12.8 sec;   INR: 1.14 ratio         PTT - ( 09 Oct 2019 08:05 )  PTT:147.0 sec    CAPILLARY BLOOD GLUCOSE          RADIOLOGY & ADDITIONAL TESTS:    Imaging Personally Reviewed:  [ ] YES  [ ] NO    Consultant(s) Notes Reviewed:  [ ] YES  [ ] NO    Care Discussed with Consultants/Other Providers [ ] YES  [ ] NO
Patient is a 68y old  Female who presents with a chief complaint of Leg pain. (25 Sep 2019 12:08), complain of leg pain.       OVERNIGHT EVENTS: none    MEDICATIONS  (STANDING):  atorvastatin 20 milliGRAM(s) Oral at bedtime  gabapentin 100 milliGRAM(s) Oral two times a day  influenza   Vaccine 0.5 milliLiter(s) IntraMuscular once  lisinopril 10 milliGRAM(s) Oral daily  nystatin Powder 1 Application(s) Topical two times a day  sodium chloride 0.9%. 1000 milliLiter(s) (85 mL/Hr) IV Continuous <Continuous>    MEDICATIONS  (PRN):  acetaminophen   Tablet .. 650 milliGRAM(s) Oral every 6 hours PRN Moderate Pain (4 - 6)        REVIEW OF SYSTEMS:  CONSTITUTIONAL: No fever,   EYES: No eye pain,    ENMT:  No difficulty hearing,    NECK: No pain or stiffness  RESPIRATORY: No cough,    CARDIOVASCULAR: No chest pain,    GASTROINTESTINAL: No abdominal or epigastric pain.    GENITOURINARY: No dysuria,    NEUROLOGICAL: No headaches,   SKIN: No itching       Vital Signs Last 24 Hrs  T(C): 35.9 (26 Sep 2019 05:01), Max: 37.4 (25 Sep 2019 17:05)  T(F): 96.7 (26 Sep 2019 05:01), Max: 99.3 (25 Sep 2019 17:05)  HR: 77 (26 Sep 2019 05:01) (77 - 102)  BP: 131/68 (26 Sep 2019 05:01) (126/63 - 158/69)  BP(mean): --  RR: 17 (26 Sep 2019 05:01) (17 - 17)  SpO2: 96% (26 Sep 2019 05:01) (94% - 96%)    PHYSICAL EXAM:  GENERAL: NAD, well-groomed, well-developed  HEAD:  Atraumatic, Normocephalic  EYES: EOMI, PERRLA, conjunctiva and sclera clear  ENMT: No tonsillar erythema, exudates, or enlargement; Moist mucous membranes   NECK: Supple, No JVD   NERVOUS SYSTEM:  Alert & Oriented X 3, left hemiparesis, her speech is clear  CHEST/LUNG: Clear to auscultation bilaterally; No rales, rhonchi, wheezing, or rubs  HEART: Regular rate and rhythm; No murmurs, rubs, or gallops  ABDOMEN: Soft, Nontender, Nondistended; Bowel sounds present  EXTREMITIES:  2+ Peripheral Pulses, bilateral leg edema.  LYMPH: No lymphadenopathy noted  SKIN: No petechiae.    LABS:                        8.7    7.36  )-----------( 232      ( 26 Sep 2019 06:06 )             29.8     09-26    143  |  112<H>  |  12  ----------------------------<  96  4.3   |  28  |  0.59    Ca    9.0      26 Sep 2019 06:06  Phos  2.0     09-26  Mg     2.0     09-26      PT/INR - ( 26 Sep 2019 09:19 )   PT: 22.5 sec;   INR: 1.97 ratio            cardiac markers Troponin --        CAPILLARY BLOOD GLUCOSE        Cultures    RADIOLOGY & ADDITIONAL TESTS:    Imaging Personally Reviewed:  [ ] YES  [ ] NO    Consultant(s) Notes Reviewed:  [ ] YES  [ ] NO    Care Discussed with Consultants/Other Providers [ ] YES  [ ] NO
Patient is a 68y old  Female who presents with a chief complaint of Leg pain. (27 Sep 2019 09:56), she has less leg pain today.       OVERNIGHT EVENTS: none    MEDICATIONS  (STANDING):  atorvastatin 20 milliGRAM(s) Oral at bedtime  gabapentin 100 milliGRAM(s) Oral two times a day  influenza   Vaccine 0.5 milliLiter(s) IntraMuscular once  lisinopril 10 milliGRAM(s) Oral daily  nystatin Powder 1 Application(s) Topical two times a day  sodium chloride 0.9%. 1000 milliLiter(s) (85 mL/Hr) IV Continuous <Continuous>    MEDICATIONS  (PRN):  acetaminophen   Tablet .. 650 milliGRAM(s) Oral every 6 hours PRN Moderate Pain (4 - 6)  traMADol 50 milliGRAM(s) Oral every 6 hours PRN Severe Pain (7 - 10)      REVIEW OF SYSTEMS:  CONSTITUTIONAL: No fever,   EYES: No eye pain,    ENMT:  No difficulty hearing,    NECK: No pain or stiffness  RESPIRATORY: No cough,   CARDIOVASCULAR: No chest pain, palpitations, dizziness, or leg swelling  GASTROINTESTINAL: No abdominal or epigastric pain.    GENITOURINARY: No dysuria, frequency, hematuria, or incontinence  NEUROLOGICAL: No headaches,   SKIN: No rash     Vital Signs Last 24 Hrs  - reviewed    PHYSICAL EXAM:  GENERAL: NAD, well-groomed, well-developed  HEAD:  Atraumatic, Normocephalic  EYES: EOMI, PERRLA, conjunctiva and sclera clear  ENMT: No tonsillar erythema, exudates, or enlargement; Moist mucous membranes   NECK: Supple, No JVD   NERVOUS SYSTEM:  Alert & Oriented X 3, good concentration;  left-sided hemiparesis.  CHEST/LUNG: Clear to auscultation  bilaterally; No rales, rhonchi, wheezing, or rubs  HEART: Regular rate and rhythm; No murmurs, rubs, or gallops  ABDOMEN: Soft, Nontender, Nondistended; Bowel sounds present  EXTREMITIES:  2+ Peripheral Pulses, bilateral leg edema.  LYMPH: No lymphadenopathy noted  SKIN: No petechiae    LABS:                        8.7    7.36  )-----------( 232      ( 26 Sep 2019 06:06 )             29.8     09-27    142  |  111<H>  |  13  ----------------------------<  97  4.4   |  27  |  0.64    Ca    9.1      27 Sep 2019 07:17  Phos  2.0     09-26  Mg     2.0     09-26      PT/INR - ( 26 Sep 2019 09:19 )   PT: 22.5 sec;   INR: 1.97 ratio            cardiac markers Troponin --        CAPILLARY BLOOD GLUCOSE        Cultures    RADIOLOGY & ADDITIONAL TESTS:    Imaging Personally Reviewed:  [ ] YES  [ ] NO    Consultant(s) Notes Reviewed:  [x ] YES  [ ] NO    Care Discussed with Consultants/Other Providers [ ] YES  [ ] NO
Patient is a 68y old  Female who presents with a chief complaint of Leg pain. (27 Sep 2019 09:56), she has less leg pain today.       OVERNIGHT EVENTS: none    MEDICATIONS  (STANDING):  atorvastatin 20 milliGRAM(s) Oral at bedtime  gabapentin 100 milliGRAM(s) Oral two times a day  influenza   Vaccine 0.5 milliLiter(s) IntraMuscular once  lisinopril 10 milliGRAM(s) Oral daily  nystatin Powder 1 Application(s) Topical two times a day  sodium chloride 0.9%. 1000 milliLiter(s) (85 mL/Hr) IV Continuous <Continuous>    MEDICATIONS  (PRN):  acetaminophen   Tablet .. 650 milliGRAM(s) Oral every 6 hours PRN Moderate Pain (4 - 6)  traMADol 50 milliGRAM(s) Oral every 6 hours PRN Severe Pain (7 - 10)      REVIEW OF SYSTEMS:  CONSTITUTIONAL: No fever,   EYES: No eye pain,    ENMT:  No difficulty hearing,    NECK: No pain or stiffness  RESPIRATORY: No cough,   CARDIOVASCULAR: No chest pain, palpitations, dizziness, or leg swelling  GASTROINTESTINAL: No abdominal or epigastric pain.    GENITOURINARY: No dysuria, frequency, hematuria, or incontinence  NEUROLOGICAL: No headaches,   SKIN: No rash     Vital Signs Last 24 Hrs  - reviewed    PHYSICAL EXAM:  GENERAL: NAD, well-groomed, well-developed  HEAD:  Atraumatic, Normocephalic  EYES: EOMI, PERRLA, conjunctiva and sclera clear  ENMT: No tonsillar erythema, exudates, or enlargement; Moist mucous membranes   NECK: Supple, No JVD   NERVOUS SYSTEM:  Alert & Oriented X 3, good concentration;  left-sided hemiparesis.  CHEST/LUNG: Clear to auscultation  bilaterally; No rales, rhonchi, wheezing, or rubs  HEART: Regular rate and rhythm; No murmurs, rubs, or gallops  ABDOMEN: Soft, Nontender, Nondistended; Bowel sounds present  EXTREMITIES:  2+ Peripheral Pulses, bilateral leg edema.  LYMPH: No lymphadenopathy noted  SKIN: No petechiae    LABS:                        8.7    7.36  )-----------( 232      ( 26 Sep 2019 06:06 )             29.8     09-27    142  |  111<H>  |  13  ----------------------------<  97  4.4   |  27  |  0.64    Ca    9.1      27 Sep 2019 07:17  Phos  2.0     09-26  Mg     2.0     09-26      PT/INR - ( 26 Sep 2019 09:19 )   PT: 22.5 sec;   INR: 1.97 ratio            cardiac markers Troponin --        CAPILLARY BLOOD GLUCOSE        Cultures    RADIOLOGY & ADDITIONAL TESTS:    Imaging Personally Reviewed:  [ ] YES  [ ] NO    Consultant(s) Notes Reviewed:  [x ] YES  [ ] NO    Care Discussed with Consultants/Other Providers [ ] YES  [ ] NO
Patient is a 68y old  Female who presents with a chief complaint of Leg pain. (27 Sep 2019 09:56), still has leg pain.       OVERNIGHT EVENTS: none    MEDICATIONS  (STANDING):  atorvastatin 20 milliGRAM(s) Oral at bedtime  gabapentin 100 milliGRAM(s) Oral two times a day  influenza   Vaccine 0.5 milliLiter(s) IntraMuscular once  lisinopril 10 milliGRAM(s) Oral daily  nystatin Powder 1 Application(s) Topical two times a day  sodium chloride 0.9%. 1000 milliLiter(s) (85 mL/Hr) IV Continuous <Continuous>    MEDICATIONS  (PRN):  acetaminophen   Tablet .. 650 milliGRAM(s) Oral every 6 hours PRN Moderate Pain (4 - 6)  traMADol 50 milliGRAM(s) Oral every 6 hours PRN Severe Pain (7 - 10)        REVIEW OF SYSTEMS:  CONSTITUTIONAL: No fever, weight loss, or fatigue  EYES: No eye pain, visual disturbances, or discharge  ENMT:  No difficulty hearing, tinnitus, vertigo.  NECK: No pain or stiffness  RESPIRATORY: No cough, wheezing, chills or hemoptysis.  CARDIOVASCULAR: No chest pain, palpitations, dizziness, or leg swelling  GASTROINTESTINAL: No abdominal or epigastric pain.    GENITOURINARY: No dysuria, frequency, hematuria, or incontinence  NEUROLOGICAL: No headaches,   SKIN: No itching, burning, rashes, or lesions      Vital Signs Last 24 Hrs  T(C): 36.2 (27 Sep 2019 10:45), Max: 36.9 (26 Sep 2019 16:05)  T(F): 97.2 (27 Sep 2019 10:45), Max: 98.5 (26 Sep 2019 16:05)  HR: 103 (27 Sep 2019 10:45) (84 - 104)  BP: 147/76 (27 Sep 2019 10:45) (147/76 - 159/87)  BP(mean): --  RR: 17 (27 Sep 2019 10:45) (17 - 18)  SpO2: 99% (27 Sep 2019 10:45) (94% - 99%)    PHYSICAL EXAM:  GENERAL: NAD, well-groomed, well-developed  HEAD:  Atraumatic, Normocephalic  EYES: EOMI, PERRLA, conjunctiva and sclera clear  ENMT: No tonsillar erythema, exudates, or enlargement; Moist mucous membranes   NECK: Supple, No JVD   NERVOUS SYSTEM:  Alert & Oriented X 3, good concentration;  left-sided hemiparesis.  CHEST/LUNG: Clear to auscultation  bilaterally; No rales, rhonchi, wheezing, or rubs  HEART: Regular rate and rhythm; No murmurs, rubs, or gallops  ABDOMEN: Soft, Nontender, Nondistended; Bowel sounds present  EXTREMITIES:  2+ Peripheral Pulses, bilateral leg edema.  LYMPH: No lymphadenopathy noted  SKIN: No rashes or lesions    LABS:                        8.7    7.36  )-----------( 232      ( 26 Sep 2019 06:06 )             29.8     09-27    142  |  111<H>  |  13  ----------------------------<  97  4.4   |  27  |  0.64    Ca    9.1      27 Sep 2019 07:17  Phos  2.0     09-26  Mg     2.0     09-26      PT/INR - ( 26 Sep 2019 09:19 )   PT: 22.5 sec;   INR: 1.97 ratio            cardiac markers Troponin --        CAPILLARY BLOOD GLUCOSE        Cultures    RADIOLOGY & ADDITIONAL TESTS:    Imaging Personally Reviewed:  [ ] YES  [ ] NO    Consultant(s) Notes Reviewed:  [x ] YES  [ ] NO    Care Discussed with Consultants/Other Providers [ ] YES  [ ] NO
Patient is a 68y old  Female who presents with a chief complaint of Leg pain. (29 Sep 2019 11:33)       OVERNIGHT EVENTS: none    MEDICATIONS  (STANDING):  atorvastatin 20 milliGRAM(s) Oral at bedtime  ferrous    sulfate 325 milliGRAM(s) Oral daily  gabapentin 100 milliGRAM(s) Oral two times a day  influenza   Vaccine 0.5 milliLiter(s) IntraMuscular once  lisinopril 10 milliGRAM(s) Oral daily  nystatin Powder 1 Application(s) Topical two times a day  sodium chloride 0.9%. 1000 milliLiter(s) (85 mL/Hr) IV Continuous <Continuous>    MEDICATIONS  (PRN):  acetaminophen   Tablet .. 650 milliGRAM(s) Oral every 6 hours PRN Moderate Pain (4 - 6)  traMADol 50 milliGRAM(s) Oral every 6 hours PRN Severe Pain (7 - 10)    REVIEW OF SYSTEMS:  CONSTITUTIONAL: No fever,   EYES: No eye pain,    ENMT:  No difficulty hearing,    NECK: No pain or stiffness  RESPIRATORY: No cough,   CARDIOVASCULAR: No chest pain, palpitations, dizziness, or leg swelling  GASTROINTESTINAL: No abdominal or epigastric pain.    GENITOURINARY: No dysuria, frequency, hematuria, or incontinence  NEUROLOGICAL: No headaches,   SKIN: No rash     Vital Signs Last 24 Hrs  T(C): 36.8 (29 Sep 2019 11:42), Max: 37 (28 Sep 2019 17:40)  T(F): 98.3 (29 Sep 2019 11:42), Max: 98.6 (28 Sep 2019 17:40)  HR: 118 (29 Sep 2019 11:42) (93 - 118)  BP: 151/76 (29 Sep 2019 11:42) (127/75 - 153/54)  BP(mean): --  RR: 18 (29 Sep 2019 11:42) (16 - 18)  SpO2: 97% (29 Sep 2019 11:42) (95% - 98%)    PHYSICAL EXAM:  GENERAL: NAD, well-groomed, well-developed  HEAD:  Atraumatic, Normocephalic  EYES: EOMI, PERRLA, conjunctiva and sclera clear  ENMT: No tonsillar erythema, exudates, or enlargement; Moist mucous membranes   NECK: Supple, No JVD   NERVOUS SYSTEM:  Alert & Oriented X 3, good concentration;  left-sided hemiparesis.  CHEST/LUNG: Clear to auscultation  bilaterally; No rales, rhonchi, wheezing, or rubs  HEART: Regular rate and rhythm; No murmurs, rubs, or gallops  ABDOMEN: Soft, Nontender, Nondistended; Bowel sounds present  EXTREMITIES:  2+ Peripheral Pulses, bilateral leg edema.  LYMPH: No lymphadenopathy noted  SKIN: No petechiae    LABS:                        8.7    9.52  )-----------( 307      ( 28 Sep 2019 06:43 )             30.1       Phos  3.1     09-28      PT/INR - ( 29 Sep 2019 07:41 )   PT: 20.0 sec;   INR: 1.75 ratio            cardiac markers     CAPILLARY BLOOD GLUCOSE        Cultures    RADIOLOGY & ADDITIONAL TESTS:    Imaging Personally Reviewed:  [ ] YES  [ ] NO    Consultant(s) Notes Reviewed:  [x ] YES  [ ] NO    Care Discussed with Consultants/Other Providers [ ] YES  [ ] NO
Patient is a 68y old  Female who presents with a chief complaint of Leg pain. (30 Sep 2019 10:18), no chest pain.    OVERNIGHT EVENTS: none  Patient feels ok, reports Lt leg pain.    no complaints.     T(C): 36.6 (10-02-19 @ 11:15), Max: 36.6 (10-02-19 @ 11:15)  HR: 88 (10-02-19 @ 11:15) (87 - 88)  BP: 112/65 (10-02-19 @ 11:15) (105/63 - 112/65)  RR: 18 (10-02-19 @ 11:15) (18 - 18)  SpO2: 95% (10-02-19 @ 11:15) (95% - 96%)    MEDICATIONS  (STANDING):  atorvastatin 20 milliGRAM(s) Oral at bedtime  ferrous    sulfate 325 milliGRAM(s) Oral daily  gabapentin 100 milliGRAM(s) Oral two times a day  influenza   Vaccine 0.5 milliLiter(s) IntraMuscular once  lisinopril 10 milliGRAM(s) Oral daily  nystatin Powder 1 Application(s) Topical two times a day  sodium chloride 0.9%. 1000 milliLiter(s) (85 mL/Hr) IV Continuous <Continuous>    MEDICATIONS  (PRN):  acetaminophen   Tablet .. 650 milliGRAM(s) Oral every 6 hours PRN Moderate Pain (4 - 6)  traMADol 50 milliGRAM(s) Oral every 6 hours PRN Severe Pain (7 - 10)    HEENT atraumatic, NC   EYES: No eye pain,    ENMT:  No difficulty hearing,    NECK: No pain or stiffness  RESPIRATORY: No cough,   CARDIOVASCULAR: No chest pain, palpitations, dizziness, or leg swelling  GASTROINTESTINAL: No abdominal or epigastric pain.    GENITOURINARY: No dysuria, frequency, hematuria, or incontinence  NEUROLOGICAL: No headaches,   SKIN: No rash     PHYSICAL EXAM:  GENERAL: NAD, well-groomed, well-developed  HEAD:  Atraumatic, Normocephalic  EYES: EOMI, conjunctiva and sclera clear  NECK: Supple, No JVD   NERVOUS SYSTEM:  Alert & Oriented X 3, left-sided hemiparesis.  CHEST/LUNG: Clear to auscultation  bilaterally; No rales, rhonchi, wheezing, or rubs  HEART: Regular rate and rhythm; No murmurs, rubs, or gallops  ABDOMEN: Soft, NT, ND; Bowel sounds present  EXTREMITIES:  2+ Peripheral Pulses, bilateral leg edema.  LYMPH: No lymphadenopathy noted  SKIN: No petechiae                              10.0   10.84 )-----------( 440      ( 02 Oct 2019 06:49 )             34.6             PT/INR - ( 02 Oct 2019 06:49 )   PT: 28.7 sec;   INR: 2.49 ratio           141|104|12<105  3.9|28|0.82  10.2,--,--  10-02 @ 06:49     CAPILLARY BLOOD GLUCOSE            RADIOLOGY & ADDITIONAL TESTS:    Imaging Personally Reviewed:  [ ] YES  [ ] NO    Consultant(s) Notes Reviewed:  [x ] YES  [ ] NO    Care Discussed with Consultants/Other Providers [ ] YES  [ ] NO
Patient is a 68y old  Female who presents with a chief complaint of Leg pain. (30 Sep 2019 10:18), no chest pain.    OVERNIGHT EVENTS: none  Patient feels ok, reports Lt leg pain.    no complaints.     T(C): 36.6 (10-02-19 @ 11:15), Max: 36.6 (10-02-19 @ 11:15)  HR: 88 (10-02-19 @ 11:15) (87 - 88)  BP: 112/65 (10-02-19 @ 11:15) (105/63 - 112/65)  RR: 18 (10-02-19 @ 11:15) (18 - 18)  SpO2: 95% (10-02-19 @ 11:15) (95% - 96%)    MEDICATIONS  (STANDING):  atorvastatin 20 milliGRAM(s) Oral at bedtime  ferrous    sulfate 325 milliGRAM(s) Oral daily  gabapentin 100 milliGRAM(s) Oral two times a day  influenza   Vaccine 0.5 milliLiter(s) IntraMuscular once  lisinopril 10 milliGRAM(s) Oral daily  nystatin Powder 1 Application(s) Topical two times a day  sodium chloride 0.9%. 1000 milliLiter(s) (85 mL/Hr) IV Continuous <Continuous>    MEDICATIONS  (PRN):  acetaminophen   Tablet .. 650 milliGRAM(s) Oral every 6 hours PRN Moderate Pain (4 - 6)  traMADol 50 milliGRAM(s) Oral every 6 hours PRN Severe Pain (7 - 10)    HEENT atraumatic, NC   EYES: No eye pain,    ENMT:  No difficulty hearing,    NECK: No pain or stiffness  RESPIRATORY: No cough,   CARDIOVASCULAR: No chest pain, palpitations, dizziness, or leg swelling  GASTROINTESTINAL: No abdominal or epigastric pain.    GENITOURINARY: No dysuria, frequency, hematuria, or incontinence  NEUROLOGICAL: No headaches,   SKIN: No rash   Vital Signs Last 24 Hrs  T(C): 36.6 (03 Oct 2019 12:00), Max: 36.6 (02 Oct 2019 17:11)  T(F): 97.8 (03 Oct 2019 12:00), Max: 97.8 (02 Oct 2019 17:11)  HR: 94 (03 Oct 2019 14:58) (86 - 108)  BP: 109/76 (03 Oct 2019 14:58) (109/76 - 149/73)  BP(mean): --  RR: 18 (03 Oct 2019 14:58) (18 - 18)  SpO2: 98% (03 Oct 2019 14:58) (93% - 99%)  PHYSICAL EXAM:  GENERAL: NAD, well-groomed, well-developed  HEAD:  Atraumatic, Normocephalic  EYES: EOMI, conjunctiva and sclera clear  NECK: Supple, No JVD   NERVOUS SYSTEM:  Alert & Oriented X 3, left-sided hemiparesis.  CHEST/LUNG: Clear to auscultation  bilaterally; No rales, rhonchi, wheezing, or rubs  HEART: Regular rate and rhythm; No murmurs, rubs, or gallops  ABDOMEN: Soft, NT, ND; Bowel sounds present  EXTREMITIES:  2+ Peripheral Pulses, bilateral leg edema.  LYMPH: No lymphadenopathy noted  SKIN: No petechiae                              10.0   10.84 )-----------( 440      ( 02 Oct 2019 06:49 )             34.6             PT/INR - ( 03 Oct 2019 06:41 )   PT: 24.9 sec;   INR: 2.17 ratio                  141|104|12<105  3.9|28|0.82  10.2,--,--  10-02 @ 06:49     CAPILLARY BLOOD GLUCOSE            RADIOLOGY & ADDITIONAL TESTS:    Imaging Personally Reviewed:  [ ] YES  [ ] NO    Consultant(s) Notes Reviewed:  [x ] YES  [ ] NO    Care Discussed with Consultants/Other Providers [ ] YES  [ ] NO
Patient seen and examined bedside resting comfortably.  C/O continued pain in her left leg.   She denies any vaginal bleeding &/ or abd. ain  Denies nausea and vomiting. Tolerating diet.    T(F): 97 (10-04-19 @ 06:02), Max: 97.8 (10-03-19 @ 12:00)  HR: 90 (10-04-19 @ 06:02) (86 - 106)  BP: 110/70 (10-04-19 @ 06:02) (109/76 - 122/87)  RR: 18 (10-04-19 @ 06:02) (18 - 18)  SpO2: 98% (10-04-19 @ 06:02) (98% - 99%)    PHYSICAL EXAM:  General: NAD  Neuro:  Alert & oriented x 3  Abdomen: soft, NTND. Normactive BS  GYN: no vagina bleeding.    LABS:                        10.4   11.76 )-----------( 364      ( 04 Oct 2019 07:30 )             35.7     10-04    137  |  104  |  13  ----------------------------<  97  3.8   |  25  |  0.71    Ca    9.8      04 Oct 2019 07:30  Phos  3.1     10-04  Mg     2.4     10-04      PT/INR - ( 04 Oct 2019 07:30 )   PT: 22.9 sec;   INR: 2.00 ratio         PTT - ( 04 Oct 2019 07:30 )  PTT:82.2 sec    I&O's Detail    03 Oct 2019 07:01  -  04 Oct 2019 07:00  --------------------------------------------------------  IN:    heparin  Infusion.: 172 mL  Total IN: 172 mL    OUT:    Voided: 1000 mL  Total OUT: 1000 mL    Total NET: -828 mL    Impression: 68y Female admitted with LEFT LEG PAIN;  LEFT HEMIPARESIS  LEG PAIN  uterine mass      Plan:   Per discussion with Dr. Petersen, he cannot take pt to the OR because of elevated INR. He feels that there will be too much blood loss from any endometrial surgery. Emmanuel need medicine to get coags under control - need INR at about 1.5.  I have discussed the pt with YASSINE Cooper.  Will schedule for OR when coags are better.
Patient seen and examined bedside resting comfortably.  No complaints offered. - denies any further vaginal bleeding.  Denies nausea and vomiting. Tolerating diet.  Flatus/BM. +  Denies chest pain, dyspnea, cough.    T(F): 97.8 (10-07-19 @ 11:13), Max: 98.1 (10-06-19 @ 18:23)  HR: 91 (10-07-19 @ 11:13) (83 - 97)  BP: 122/64 (10-07-19 @ 11:13) (122/64 - 131/71)  RR: 17 (10-07-19 @ 11:13) (17 - 18)  SpO2: 98% (10-07-19 @ 11:13) (95% - 98%)    PHYSICAL EXAM:  General: NAD  Neuro:  Alert & oriented x 3  Abdomen: soft, NTND. Normactive BS  GN: no further vaginal bleeding      LABS:                        9.7    10.07 )-----------( 420      ( 07 Oct 2019 06:12 )             33.2     10-07    142  |  107  |  9   ----------------------------<  105<H>  3.4<L>   |  27  |  0.72    Ca    10.0      07 Oct 2019 06:12  Phos  3.0     10-07  Mg     2.4     10-07      PT/INR - ( 07 Oct 2019 06:12 )   PT: 12.6 sec;   INR: 1.12 ratio         PTT - ( 07 Oct 2019 06:12 )  PTT:66.6 sec  I&O's Detail    06 Oct 2019 07:01  -  07 Oct 2019 07:00  --------------------------------------------------------  IN:    Oral Fluid: 120 mL  Total IN: 120 mL    OUT:    Voided: 1800 mL  Total OUT: 1800 mL    Total NET: -1680 mL      07 Oct 2019 07:01  -  07 Oct 2019 16:12  --------------------------------------------------------  IN:  Total IN: 0 mL    OUT:  Total OUT: 0 mL    Total NET: 0 mL          Impression: 68y Female admitted with LEFT LEG PAIN;  LEFT HEMIPARESIS LEG PAIN  vaginal bleeding due to fibroid - no further bleeding    Plan:  scheduled for D&C n OR tomorrow.  will hold heparin drip @ 6 AM  Or scheduled for 1230hr.  medicine monitoring the protime  T&S for AM
Patient seen at bedside in no distress.  Denies vaginal bleeding. Denies abdominal pain.    Vital Signs Last 24 Hrs  T(F): 97 (10-06-19 @ 11:43), Max: 98.7 (10-05-19 @ 16:50)  HR: 93 (10-06-19 @ 11:43)  BP: 117/61 (10-06-19 @ 11:43)  RR: 16 (10-06-19 @ 11:43)  SpO2: 100% (10-06-19 @ 11:43)    GENERAL: Alert, oriented, NAD  CHEST/LUNG: Respirations nonlabored  HEART: S1S2, RRR  ABDOMEN: Soft, NTND  EXTREMITIES: No pedal edema     LABS:                        9.6    9.39  )-----------( 422      ( 06 Oct 2019 08:19 )             33.2     10-06    140  |  105  |  9   ----------------------------<  101<H>  3.7   |  28  |  0.70    Ca    10.2<H>      06 Oct 2019 08:19  Phos  3.0     10-06  Mg     2.4     10-06      PT/INR - ( 06 Oct 2019 08:19 )   PT: 14.4 sec;   INR: 1.28 ratio         PTT - ( 06 Oct 2019 08:19 )  PTT:80.4 sec    A/P: 68F with multiple comorbidities with distention of the endometrial cavity, dermoid cyst   Discussed case with Dr. Petersen.   Medical note appreciated; patient medically optimized for OR.  INR in range for procedure.  Will book for OR Tuesday AM, assuming family consents; Dr. Petersen to obtain consent.  F/u labs  Preop
Postoperative Day # 1  s/p D&C    Patient seen and examined bedside resting comfortably.  Continues to c/o persistent pain in her left leg  No c/o abdominal pain or vaginal bleeding.  Denies nausea and vomiting. Tolerating diet.  Flatus/BM. +  Denies chest pain, dyspnea, cough.    T(F): 97.1 (10-09-19 @ 05:07), Max: 98.4 (10-09-19 @ 01:51)  HR: 97 (10-09-19 @ 05:07) (78 - 97)  BP: 117/69 (10-09-19 @ 05:07) (104/67 - 150/76)  RR: 18 (10-09-19 @ 05:07) (14 - 19)  SpO2: 98% (10-09-19 @ 05:07) (96% - 100%)      PHYSICAL EXAM:  General: NAD  Neuro:  Alert & oriented x 3  Abdomen: soft, NTND. Normactive BS  GYN no vaginal bleeding noted.    LABS:                        9.3    10.08 )-----------( 374      ( 09 Oct 2019 08:05 )             31.9     10-09    140  |  105  |  10  ----------------------------<  100<H>  4.2   |  28  |  0.77    Ca    10.4<H>      09 Oct 2019 08:05  Phos  2.9     10-08  Mg     2.4     10-08    operative pathology pending      08 Oct 2019 07:01  -  09 Oct 2019 07:00  --------------------------------------------------------  IN:    heparin  Infusion.: 168 mL    lactated ringers.: 900 mL  Total IN: 1068 mL    OUT:    Voided: 2850 mL  Total OUT: 2850 mL    Total NET: -1782 mL          Impression: 68y Female Postoperative Day # 1  s/p D&C  left hemiparesis  HTN  hyperlipidema    Plan:  -can restart on & discontinue heparin drip per medicine.  - continue medical f/u  - pt discussed with Dr. Petersen, by phone. Pt is GYN cleared for discharge when OK with medicine. Will need to f//u in Dr. Petersen's office in about 2 weeks.
Vital Signs Last 24 Hrs  T(C): 36.4 (29 Sep 2019 05:02), Max: 37 (28 Sep 2019 17:40)  T(F): 97.6 (29 Sep 2019 05:02), Max: 98.6 (28 Sep 2019 17:40)  HR: 93 (29 Sep 2019 05:02) (93 - 98)  BP: 127/75 (29 Sep 2019 05:02) (127/75 - 153/54)  BP(mean): --  RR: 16 (29 Sep 2019 05:02) (16 - 18)  SpO2: 95% (29 Sep 2019 05:02) (95% - 98%)    PHYSICAL EXAM:    general - AAO x 3, left hemiparesis  HEENT - No Icterus  CVS - RRR  RS - AE B/L  Abd - soft, NT  Ext - Pulses +        LABS:                        8.7    9.52  )-----------( 307      ( 28 Sep 2019 06:43 )             30.1       Phos  3.1     09-28      PT/INR - ( 29 Sep 2019 07:41 )   PT: 20.0 sec;   INR: 1.75 ratio               Culture - Urine (collected 24 Sep 2019 09:43)  Source: .Urine  Final Report (25 Sep 2019 06:33):    No growth        RADIOLOGY & ADDITIONAL STUDIES:
for D & C today	    Vital Signs Last 24 Hrs  T(C): 36.1 (04 Oct 2019 06:02), Max: 36.6 (03 Oct 2019 12:00)  T(F): 97 (04 Oct 2019 06:02), Max: 97.8 (03 Oct 2019 12:00)  HR: 90 (04 Oct 2019 06:02) (86 - 106)  BP: 110/70 (04 Oct 2019 06:02) (109/76 - 122/87)  BP(mean): --  RR: 18 (04 Oct 2019 06:02) (18 - 18)  SpO2: 98% (04 Oct 2019 06:02) (98% - 99%)    PHYSICAL EXAM:    general - AAO x 3  HEENT - No Icterus  CVS - RRR  RS - AE B/L  Abd - soft, NT  Ext - Pulses +        LABS:                        10.4   11.76 )-----------( 364      ( 04 Oct 2019 07:30 )             35.7     10-04    137  |  104  |  13  ----------------------------<  97  3.8   |  25  |  0.71    Ca    9.8      04 Oct 2019 07:30  Phos  3.1     10-04  Mg     2.4     10-04      PT/INR - ( 04 Oct 2019 07:30 )   PT: 22.9 sec;   INR: 2.00 ratio         PTT - ( 04 Oct 2019 07:30 )  PTT:82.2 sec      Culture - Urine (collected 24 Sep 2019 09:43)  Source: .Urine  Final Report (25 Sep 2019 06:33):    No growth        RADIOLOGY & ADDITIONAL STUDIES:
lying in bed    Vital Signs Last 24 Hrs  T(C): 36.2 (02 Oct 2019 05:52), Max: 36.8 (01 Oct 2019 17:44)  T(F): 97.1 (02 Oct 2019 05:52), Max: 98.2 (01 Oct 2019 17:44)  HR: 87 (02 Oct 2019 05:52) (87 - 107)  BP: 105/63 (02 Oct 2019 05:52) (105/63 - 129/70)  BP(mean): --  RR: 18 (02 Oct 2019 05:52) (17 - 18)  SpO2: 96% (02 Oct 2019 05:52) (95% - 96%)    PHYSICAL EXAM:    general - AAO x 3  HEENT - No Icterus  CVS - RRR  RS - AE B/L  Abd - soft, NT  Ext - Pulses +        LABS:                        10.0   10.84 )-----------( 440      ( 02 Oct 2019 06:49 )             34.6     10-02    141  |  104  |  12  ----------------------------<  105<H>  3.9   |  28  |  0.82    Ca    10.2<H>      02 Oct 2019 06:49      PT/INR - ( 02 Oct 2019 06:49 )   PT: 28.7 sec;   INR: 2.49 ratio               Culture - Urine (collected 24 Sep 2019 09:43)  Source: .Urine  Final Report (25 Sep 2019 06:33):    No growth        RADIOLOGY & ADDITIONAL STUDIES:
lying in bed    Vital Signs Last 24 Hrs  T(C): 36.2 (30 Sep 2019 05:02), Max: 37.1 (29 Sep 2019 17:29)  T(F): 97.1 (30 Sep 2019 05:02), Max: 98.7 (29 Sep 2019 17:29)  HR: 83 (30 Sep 2019 05:02) (83 - 118)  BP: 109/64 (30 Sep 2019 05:02) (100/59 - 151/76)  BP(mean): --  RR: 18 (30 Sep 2019 05:02) (18 - 18)  SpO2: 98% (30 Sep 2019 05:02) (96% - 98%)    PHYSICAL EXAM:    general - AAO x 3, left sided hemiparesis  HEENT - No Icterus  CVS - RRR  RS - AE B/L  Abd - soft, NT  Ext - Pulses +                 Culture - Urine (collected 24 Sep 2019 09:43)  Source: .Urine  Final Report (25 Sep 2019 06:33):    No growth        RADIOLOGY & ADDITIONAL STUDIES:
lying in bed    Vital Signs Last 24 Hrs  T(C): 36.3 (28 Sep 2019 05:03), Max: 36.6 (27 Sep 2019 17:02)  T(F): 97.3 (28 Sep 2019 05:03), Max: 97.9 (27 Sep 2019 17:02)  HR: 81 (28 Sep 2019 05:03) (79 - 103)  BP: 127/66 (28 Sep 2019 05:03) (119/64 - 147/76)  BP(mean): --  RR: 17 (28 Sep 2019 05:03) (17 - 17)  SpO2: 94% (28 Sep 2019 05:03) (94% - 99%)    PHYSICAL EXAM:    general - AAO x 3, left hemiparesis  HEENT - No Icterus  CVS - RRR  RS - AE B/L  Abd - soft, NT  Ext - Pulses +        LABS:                        8.7    9.52  )-----------( 307      ( 28 Sep 2019 06:43 )             30.1     09-27    142  |  111<H>  |  13  ----------------------------<  97  4.4   |  27  |  0.64    Ca    9.1      27 Sep 2019 07:17  Phos  3.1     09-28      PT/INR - ( 28 Sep 2019 06:43 )   PT: 19.1 sec;   INR: 1.68 ratio               Culture - Urine (collected 24 Sep 2019 09:43)  Source: .Urine  Final Report (25 Sep 2019 06:33):    No growth        RADIOLOGY & ADDITIONAL STUDIES:
lying in bed    Vital Signs Last 24 Hrs  T(C): 36.6 (10 Oct 2019 05:38), Max: 36.8 (09 Oct 2019 09:45)  T(F): 97.9 (10 Oct 2019 05:38), Max: 98.3 (10 Oct 2019 00:00)  HR: 96 (10 Oct 2019 05:38) (95 - 97)  BP: 121/63 (10 Oct 2019 05:38) (119/62 - 142/72)  BP(mean): --  RR: 17 (10 Oct 2019 05:38) (17 - 18)  SpO2: 99% (10 Oct 2019 05:38) (95% - 99%)    PHYSICAL EXAM:    general - AAO x 3  HEENT - No Icterus  CVS - RRR  RS - AE B/L  Abd - soft, NT  Ext - Pulses +        LABS:                        9.3    10.06 )-----------( 354      ( 10 Oct 2019 07:21 )             32.1     10-10    142  |  109<H>  |  7   ----------------------------<  98  3.9   |  28  |  0.69    Ca    10.8<H>      10 Oct 2019 07:21    TPro  7.1  /  Alb  2.9<L>  /  TBili  0.4  /  DBili  x   /  AST  27  /  ALT  41  /  AlkPhos  134<H>  10-10    PT/INR - ( 10 Oct 2019 07:21 )   PT: 13.5 sec;   INR: 1.20 ratio         PTT - ( 09 Oct 2019 17:58 )  PTT:49.0 sec        RADIOLOGY & ADDITIONAL STUDIES:
lying in bed, for D & C today    Vital Signs Last 24 Hrs  T(C): 36.1 (07 Oct 2019 06:21), Max: 36.7 (06 Oct 2019 18:23)  T(F): 97 (07 Oct 2019 06:21), Max: 98.1 (06 Oct 2019 18:23)  HR: 83 (07 Oct 2019 06:21) (83 - 97)  BP: 131/71 (07 Oct 2019 06:21) (117/61 - 131/71)  BP(mean): --  RR: 18 (07 Oct 2019 06:21) (16 - 18)  SpO2: 97% (07 Oct 2019 06:21) (95% - 100%)    PHYSICAL EXAM:    general - AAO x 3  HEENT - No Icterus  CVS - RRR  RS - AE B/L  Abd - soft, NT  Ext - Pulses +        LABS:                        9.7    10.07 )-----------( 420      ( 07 Oct 2019 06:12 )             33.2     10-07    142  |  107  |  9   ----------------------------<  105<H>  3.4<L>   |  27  |  0.72    Ca    10.0      07 Oct 2019 06:12  Phos  3.0     10-07  Mg     2.4     10-07      PT/INR - ( 07 Oct 2019 06:12 )   PT: 12.6 sec;   INR: 1.12 ratio         PTT - ( 07 Oct 2019 06:12 )  PTT:66.6 sec        RADIOLOGY & ADDITIONAL STUDIES:
lying in bed, for D & C today    Vital Signs Last 24 Hrs  T(C): 36.1 (08 Oct 2019 05:28), Max: 37.1 (07 Oct 2019 16:55)  T(F): 97 (08 Oct 2019 05:28), Max: 98.7 (07 Oct 2019 16:55)  HR: 89 (08 Oct 2019 05:28) (85 - 92)  BP: 136/78 (08 Oct 2019 05:28) (111/70 - 136/78)  BP(mean): --  RR: 18 (08 Oct 2019 05:28) (17 - 18)  SpO2: 98% (08 Oct 2019 05:28) (96% - 98%)    PHYSICAL EXAM:    general - AAO x 3  HEENT - No Icterus  CVS - RRR  RS - AE B/L  Abd - soft, NT  Ext - Pulses +        LABS:                        9.8    9.13  )-----------( 416      ( 08 Oct 2019 07:16 )             33.6     10-08    138  |  107  |  8   ----------------------------<  94  4.5   |  27  |  0.64    Ca    10.5<H>      08 Oct 2019 07:16  Phos  2.9     10-08  Mg     2.4     10-08      PT/INR - ( 08 Oct 2019 07:16 )   PT: 12.5 sec;   INR: 1.11 ratio         PTT - ( 08 Oct 2019 07:16 )  PTT:42.9 sec        RADIOLOGY & ADDITIONAL STUDIES:
lying in bed, for biopsy tomorrow	    Vital Signs Last 24 Hrs  T(C): 36.1 (06 Oct 2019 05:07), Max: 37.1 (05 Oct 2019 16:50)  T(F): 97 (06 Oct 2019 05:07), Max: 98.7 (05 Oct 2019 16:50)  HR: 87 (06 Oct 2019 05:07) (87 - 94)  BP: 136/75 (06 Oct 2019 05:07) (123/79 - 141/82)  BP(mean): --  RR: 18 (06 Oct 2019 05:07) (16 - 18)  SpO2: 100% (06 Oct 2019 05:07) (96% - 100%)    PHYSICAL EXAM:    general - AAO x 3  HEENT - No Icterus  CVS - RRR  RS - AE B/L  Abd - soft, NT  Ext - Pulses +        LABS:                        9.6    9.39  )-----------( 422      ( 06 Oct 2019 08:19 )             33.2     10-06    140  |  105  |  9   ----------------------------<  101<H>  3.7   |  28  |  0.70    Ca    10.2<H>      06 Oct 2019 08:19  Phos  3.0     10-06  Mg     2.4     10-06      PT/INR - ( 06 Oct 2019 08:19 )   PT: 14.4 sec;   INR: 1.28 ratio         PTT - ( 06 Oct 2019 08:19 )  PTT:80.4 sec        RADIOLOGY & ADDITIONAL STUDIES:
lying in bed, not seen by Gyn yet    Vital Signs Last 24 Hrs  T(C): 36 (01 Oct 2019 05:16), Max: 36.4 (30 Sep 2019 12:06)  T(F): 96.8 (01 Oct 2019 05:16), Max: 97.5 (30 Sep 2019 12:06)  HR: 104 (01 Oct 2019 05:16) (99 - 104)  BP: 149/97 (01 Oct 2019 05:16) (131/68 - 149/97)  BP(mean): --  RR: 18 (01 Oct 2019 05:16) (18 - 18)  SpO2: 97% (01 Oct 2019 05:16) (94% - 98%)    PHYSICAL EXAM:    general - AAO x 3, hemiparesis  HEENT - No Icterus  CVS - RRR  RS - AE B/L  Abd - soft, NT  Ext - Pulses +            Culture - Urine (collected 24 Sep 2019 09:43)  Source: .Urine  Final Report (25 Sep 2019 06:33):    No growth        RADIOLOGY & ADDITIONAL STUDIES:
lying in bed,, for D and C monday    Vital Signs Last 24 Hrs  T(C): 36.1 (05 Oct 2019 06:34), Max: 36.8 (04 Oct 2019 17:18)  T(F): 97 (05 Oct 2019 06:34), Max: 98.2 (04 Oct 2019 17:18)  HR: 79 (05 Oct 2019 06:34) (79 - 97)  BP: 123/53 (05 Oct 2019 06:34) (123/53 - 158/71)  BP(mean): --  RR: 16 (05 Oct 2019 06:34) (16 - 18)  SpO2: 99% (05 Oct 2019 06:34) (95% - 99%)    PHYSICAL EXAM:    general - AAO x 3  HEENT - No Icterus  CVS - RRR  RS - AE B/L  Abd - soft, NT  Ext - Pulses +        LABS:                        9.4    10.02 )-----------( 358      ( 05 Oct 2019 02:06 )             31.6     10-05    140  |  104  |  11  ----------------------------<  103<H>  3.7   |  28  |  0.68    Ca    10.0      05 Oct 2019 02:06  Phos  3.1     10-04  Mg     2.4     10-04      PT/INR - ( 05 Oct 2019 02:06 )   PT: 21.2 sec;   INR: 1.86 ratio         PTT - ( 05 Oct 2019 02:06 )  PTT:102.3 sec        RADIOLOGY & ADDITIONAL STUDIES:
s/p D & C    Vital Signs Last 24 Hrs  T(C): 36.2 (09 Oct 2019 05:07), Max: 36.9 (09 Oct 2019 01:51)  T(F): 97.1 (09 Oct 2019 05:07), Max: 98.4 (09 Oct 2019 01:51)  HR: 97 (09 Oct 2019 05:07) (78 - 97)  BP: 117/69 (09 Oct 2019 05:07) (104/67 - 150/76)  BP(mean): --  RR: 18 (09 Oct 2019 05:07) (14 - 19)  SpO2: 98% (09 Oct 2019 05:07) (96% - 100%)    PHYSICAL EXAM:    general - AAO x 3  HEENT - No Icterus  CVS - RRR  RS - AE B/L  Abd - soft, NT  Ext - Pulses +        LABS:                        9.3    10.08 )-----------( 374      ( 09 Oct 2019 08:05 )             31.9     10-09    140  |  105  |  10  ----------------------------<  100<H>  4.2   |  28  |  0.77    Ca    10.4<H>      09 Oct 2019 08:05  Phos  2.9     10-08  Mg     2.4     10-08      PT/INR - ( 08 Oct 2019 07:16 )   PT: 12.5 sec;   INR: 1.11 ratio         PTT - ( 08 Oct 2019 23:45 )  PTT:81.8 sec        RADIOLOGY & ADDITIONAL STUDIES:
sitting up in chair, for possible D & C    Vital Signs Last 24 Hrs  T(C): 35.9 (03 Oct 2019 05:13), Max: 36.6 (02 Oct 2019 11:15)  T(F): 96.6 (03 Oct 2019 05:13), Max: 97.8 (02 Oct 2019 11:15)  HR: 86 (03 Oct 2019 05:13) (86 - 108)  BP: 136/77 (03 Oct 2019 05:13) (112/65 - 149/73)  BP(mean): --  RR: 18 (03 Oct 2019 05:13) (18 - 18)  SpO2: 98% (03 Oct 2019 05:13) (93% - 98%)    PHYSICAL EXAM:    general - AAO x 3, hemiparesis  HEENT - No Icterus  CVS - RRR  RS - AE B/L  Abd - soft, NT  Ext - Pulses +        LABS:                        10.0   10.84 )-----------( 440      ( 02 Oct 2019 06:49 )             34.6     10-02    141  |  104  |  12  ----------------------------<  105<H>  3.9   |  28  |  0.82    Ca    10.2<H>      02 Oct 2019 06:49      PT/INR - ( 03 Oct 2019 06:41 )   PT: 24.9 sec;   INR: 2.17 ratio               Culture - Urine (collected 24 Sep 2019 09:43)  Source: .Urine  Final Report (25 Sep 2019 06:33):    No growth        RADIOLOGY & ADDITIONAL STUDIES:
Patient is a 68y old  Female who presents with a chief complaint of Leg pain. (30 Sep 2019 10:18), no chest pain.    OVERNIGHT EVENTS: none  Patient feels ok, no complaints.     T(C): 36.8 (10-01-19 @ 17:44), Max: 36.8 (10-01-19 @ 17:44)  HR: 107 (10-01-19 @ 17:44) (107 - 107)  BP: 129/70 (10-01-19 @ 17:44) (129/70 - 129/70)  RR: 17 (10-01-19 @ 17:44) (17 - 17)  SpO2: 96% (10-01-19 @ 17:44) (96% - 96%)      MEDICATIONS  (STANDING):  atorvastatin 20 milliGRAM(s) Oral at bedtime  ferrous    sulfate 325 milliGRAM(s) Oral daily  gabapentin 100 milliGRAM(s) Oral two times a day  influenza   Vaccine 0.5 milliLiter(s) IntraMuscular once  lisinopril 10 milliGRAM(s) Oral daily  nystatin Powder 1 Application(s) Topical two times a day  sodium chloride 0.9%. 1000 milliLiter(s) (85 mL/Hr) IV Continuous <Continuous>    MEDICATIONS  (PRN):  acetaminophen   Tablet .. 650 milliGRAM(s) Oral every 6 hours PRN Moderate Pain (4 - 6)  traMADol 50 milliGRAM(s) Oral every 6 hours PRN Severe Pain (7 - 10)  EYES: No eye pain,    ENMT:  No difficulty hearing,    NECK: No pain or stiffness  RESPIRATORY: No cough,   CARDIOVASCULAR: No chest pain, palpitations, dizziness, or leg swelling  GASTROINTESTINAL: No abdominal or epigastric pain.    GENITOURINARY: No dysuria, frequency, hematuria, or incontinence  NEUROLOGICAL: No headaches,   SKIN: No rash     PHYSICAL EXAM:  GENERAL: NAD, well-groomed, well-developed  HEAD:  Atraumatic, Normocephalic  EYES: EOMI, conjunctiva and sclera clear  NECK: Supple, No JVD   NERVOUS SYSTEM:  Alert & Oriented X 3, left-sided hemiparesis.  CHEST/LUNG: Clear to auscultation  bilaterally; No rales, rhonchi, wheezing, or rubs  HEART: Regular rate and rhythm; No murmurs, rubs, or gallops  ABDOMEN: Soft, NT, ND; Bowel sounds present  EXTREMITIES:  2+ Peripheral Pulses, bilateral leg edema.  LYMPH: No lymphadenopathy noted  SKIN: No petechiae              PT/INR - ( 01 Oct 2019 09:07 )   PT: 29.0 sec;   INR: 2.52 ratio             RADIOLOGY & ADDITIONAL TESTS:    Imaging Personally Reviewed:  [ ] YES  [ ] NO    Consultant(s) Notes Reviewed:  [x ] YES  [ ] NO    Care Discussed with Consultants/Other Providers [ ] YES  [ ] NO

## 2019-10-10 NOTE — DISCHARGE NOTE PROVIDER - NSDCCPCAREPLAN_GEN_ALL_CORE_FT
PRINCIPAL DISCHARGE DIAGNOSIS  Diagnosis: Leg pain, diffuse, left  Assessment and Plan of Treatment: 68 year old female PMH CVA w left hemiparesis, HTN, HL brought by ems with daughter c/o bilateral legs swelling and pain L>R for last couple of days. Pt has a hx of CVA with left side weakness and pt is bed bound. Pt is taking coumadin and dose had been increased two days ago. Pt denies recent hx of trauma, headache, dizziness, blurred visions, light sensitivity, focal/distal weakness or numbness, cough, sob, chest pain, nausea, vomiting, fever, chills, abd pain, dysuria or irregular bowel movements. Pt anticoagulated on coumadin, ultrasound shows no DVT.  Leg pain, diffuse, left:  - Pain control, no DVT.   - DC to [facility],   H/o CVA:  - Left hemiparesis  - Pt on coumadin for CVA risk reduction, pt not aware of any A fib  - No need of bridging with heparin, coumadin per INR for now. c/w 3 mg qhs   - Advised pt to follow up with primary doctor regarding AC.  HTN:  - Lisinopril 10 mg po daily.   Uterine mass:  - S/P I and D  - Will need to f//u in Dr. Petersen's office in 1- 2 weeks for biopsy report.        SECONDARY DISCHARGE DIAGNOSES  Diagnosis: Left hemiparesis  Assessment and Plan of Treatment:

## 2019-10-10 NOTE — PROGRESS NOTE ADULT - PROBLEM SELECTOR PROBLEM 1
Leg pain, diffuse, left
Leg pain, diffuse, left
Anemia
Leg pain, diffuse, left
Uterine mass
Leg pain, diffuse, left

## 2019-10-10 NOTE — DISCHARGE NOTE NURSING/CASE MANAGEMENT/SOCIAL WORK - NSDCPEWEB_GEN_ALL_CORE
[place] website --- [email or web address] for Tobacco Control website --- [email or web address]

## 2019-10-10 NOTE — PROGRESS NOTE ADULT - PROBLEM SELECTOR PLAN 1
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 Ifex, serum free light chains

## 2019-10-10 NOTE — DISCHARGE NOTE NURSING/CASE MANAGEMENT/SOCIAL WORK - NSDCPEEMAIL_GEN_ALL_CORE
[facility] for Tobacco Control email [email or web address]

## 2019-10-10 NOTE — PROGRESS NOTE ADULT - ASSESSMENT
68 year old female PMH CVA w left hemiparesis, HTN, s/p  shunt brought by ems with daughter c/o bilateral legs swelling and pain L>R for last couple of days. Pt has a hx of CVA with left side weakness and pt is bed bound. Pt is taking coumadin and dose had been increased two days ago. Pt denies recent hx of trauma, headache, dizziness, blurred visions, light sensitivity, focal/distal weakness or numbness, cough, sob, chest pain, nausea, vomiting, fever, chills, abd pain, dysuria or irregular bowel movements. Pt anticoagulated on coumadin, ultrasound shows no DVT.    patient on coumadin, follow INR
68 year old female PMH CVA w left hemiparesis, HTN, s/p  shunt brought by ems with daughter c/o bilateral legs swelling and pain L>R for last couple of days. Pt has a hx of CVA with left side weakness and pt is bed bound. Pt is taking coumadin and dose had been increased two days ago. Pt denies recent hx of trauma, headache, dizziness, blurred visions, light sensitivity, focal/distal weakness or numbness, cough, sob, chest pain, nausea, vomiting, fever, chills, abd pain, dysuria or irregular bowel movements. Pt anticoagulated on coumadin, ultrasound shows no DVT.    patient on coumadin, follow INR
68 year old female PMH CVA w left hemiparesis, HTN, HL brought by ems with daughter c/o bilateral legs swelling and pain L>R for last couple of days. Pt has a hx of CVA with left side weakness and pt is bed bound. Pt is taking coumadin and dose had been increased two days ago. Pt denies recent hx of trauma, headache, dizziness, blurred visions, light sensitivity, focal/distal weakness or numbness, cough, sob, chest pain, nausea, vomiting, fever, chills, abd pain, dysuria or irregular bowel movements. Pt anticoagulated on coumadin, ultrasound shows no DVT.      Leg pain, diffuse, left:  - Pain control, no DVT.   - DC to [facility], family will discuss with pt       H/o CVA:  - Left hemiparesis  - Pt on coumadin for CVA risk reduction, pt not aware of any A fib  - No need of bridging with heparin, coumadin per INR for now  - Advised pt to follow up with primary doctor regarding AC.      HTN:  - Lisinopril 10 mg po daily.         Uterine mass:  - S/P I and D  - Will need to f//u in Dr. Petersen's office in about 2 weeks for biopsy report.
68 year old female PMH CVA w left hemiparesis, HTN, HL brought by ems with daughter c/o bilateral legs swelling and pain L>R for last couple of days. Pt has a hx of CVA with left side weakness and pt is bed bound. Pt is taking coumadin and dose had been increased two days ago. Pt denies recent hx of trauma, headache, dizziness, blurred visions, light sensitivity, focal/distal weakness or numbness, cough, sob, chest pain, nausea, vomiting, fever, chills, abd pain, dysuria or irregular bowel movements. Pt anticoagulated on coumadin, ultrasound shows no DVT.      Leg pain, diffuse, left:  - Pain control, no DVT.   - DC to [facility],       H/o CVA:  - Left hemiparesis  - Pt on coumadin for CVA risk reduction, pt not aware of any A fib  - No need of bridging with heparin, coumadin per INR for now. c/w 3 mg qhs   - Advised pt to follow up with primary doctor regarding AC.      HTN:  - Lisinopril 10 mg po daily.         Uterine mass:  - S/P I and D  - Will need to f//u in Dr. Petersen's office in about 2 weeks for biopsy report.
68 year old female PMH CVA w left hemiparesis, HTN, HL brought by ems with daughter c/o bilateral legs swelling and pain L>R for last couple of days. Pt has a hx of CVA with left side weakness and pt is bed bound. Pt is taking coumadin and dose had been increased two days ago. Pt denies recent hx of trauma, headache, dizziness, blurred visions, light sensitivity, focal/distal weakness or numbness, cough, sob, chest pain, nausea, vomiting, fever, chills, abd pain, dysuria or irregular bowel movements. Pt anticoagulated on coumadin, ultrasound shows no DVT.      No further medical optimization required  prior to surgery Would reccomend dc  heparin 6 hours before surgery patient is CLEAR for surgery patient is a  moderate cardiovascular risk for a low risk procedure
68 year old female PMH CVA w left hemiparesis, HTN, HL brought by ems with daughter c/o bilateral legs swelling and pain L>R for last couple of days. Pt has a hx of CVA with left side weakness and pt is bed bound. Pt is taking coumadin and dose had been increased two days ago. Pt denies recent hx of trauma, headache, dizziness, blurred visions, light sensitivity, focal/distal weakness or numbness, cough, sob, chest pain, nausea, vomiting, fever, chills, abd pain, dysuria or irregular bowel movements. Pt anticoagulated on coumadin, ultrasound shows no DVT.      No further medical optimization required  prior to surgery Would reccomend dc  heparin 6 hours before surgery patient is CLEAR for surgery patient is a  moderate cardiovascular risk for a low risk procedure
68 year old female PMH CVA w left hemiparesis, HTN, HL brought by ems with daughter c/o bilateral legs swelling and pain L>R for last couple of days. Pt has a hx of CVA with left side weakness and pt is bed bound. Pt is taking coumadin and dose had been increased two days ago. Pt denies recent hx of trauma, headache, dizziness, blurred visions, light sensitivity, focal/distal weakness or numbness, cough, sob, chest pain, nausea, vomiting, fever, chills, abd pain, dysuria or irregular bowel movements. Pt anticoagulated on coumadin, ultrasound shows no DVT.      S/p procedure will need to follow up biopsy report as outpatient anxious to return home on coumadin for cva risk reduction since 2012 will continue but can likely be followed as out patient     will need to follow up with case management if she is home or rehab
68 year old female PMH CVA w left hemiparesis, HTN, HL brought by ems with daughter c/o bilateral legs swelling and pain L>R for last couple of days. Pt has a hx of CVA with left side weakness and pt is bed bound. Pt is taking coumadin and dose had been increased two days ago. Pt denies recent hx of trauma, headache, dizziness, blurred visions, light sensitivity, focal/distal weakness or numbness, cough, sob, chest pain, nausea, vomiting, fever, chills, abd pain, dysuria or irregular bowel movements. Pt anticoagulated on coumadin, ultrasound shows no DVT.  IMPROVE VTE Individual Risk Assessment          RISK                                                          Points    [  ] Previous VTE                                                3    [  ] Thrombophilia                                             2    [x  ] Lower limb paralysis                                    2        (unable to hold up >15 seconds)      [  ] Current Cancer                                             2         (within 6 months)    [ x ] Immobilization > 24 hrs                              1    [  ] ICU/CCU stay > 24 hours                            1    [ x ] Age > 60                                                    1    IMPROVE VTE Score ___4______
68 year old female PMH CVA w left hemiparesis, HTN, HL brought by ems with daughter c/o bilateral legs swelling and pain L>R for last couple of days. Pt has a hx of CVA with left side weakness and pt is bed bound. Pt is taking coumadin and dose had been increased two days ago. Pt denies recent hx of trauma, headache, dizziness, blurred visions, light sensitivity, focal/distal weakness or numbness, cough, sob, chest pain, nausea, vomiting, fever, chills, abd pain, dysuria or irregular bowel movements. Pt anticoagulated on coumadin, ultrasound shows no DVT.  IMPROVE VTE Individual Risk Assessment          RISK                                                          Points    [  ] Previous VTE                                                3    [  ] Thrombophilia                                             2    [x  ] Lower limb paralysis                                    2        (unable to hold up >15 seconds)      [  ] Current Cancer                                             2         (within 6 months)    [ x ] Immobilization > 24 hrs                              1    [  ] ICU/CCU stay > 24 hours                            1    [ x ] Age > 60                                                    1    IMPROVE VTE Score ___4______
68 year old female PMH CVA w left hemiparesis, HTN, s/p  shunt brought by ems with daughter c/o bilateral legs swelling and pain L>R for last couple of days. Pt has a hx of CVA with left side weakness and pt is bed bound. Pt is taking coumadin and dose had been increased two days ago. Pt denies recent hx of trauma, headache, dizziness, blurred visions, light sensitivity, focal/distal weakness or numbness, cough, sob, chest pain, nausea, vomiting, fever, chills, abd pain, dysuria or irregular bowel movements. Pt anticoagulated on coumadin, ultrasound shows no DVT.
68 year old female PMH CVA w left hemiparesis, HTN, s/p  shunt brought by ems with daughter c/o bilateral legs swelling and pain L>R for last couple of days. Pt has a hx of CVA with left side weakness and pt is bed bound. Pt is taking coumadin and dose had been increased two days ago. Pt denies recent hx of trauma, headache, dizziness, blurred visions, light sensitivity, focal/distal weakness or numbness, cough, sob, chest pain, nausea, vomiting, fever, chills, abd pain, dysuria or irregular bowel movements. Pt anticoagulated on coumadin, ultrasound shows no DVT.    patient on coumadin, follow INR
Anemia
Uterine Mass
Uterine Mass/Adnxal mass
Uterine Thickening
Uterine mass
68 year old female PMH CVA w left hemiparesis, HTN, s/p  shunt brought by ems with daughter c/o bilateral legs swelling and pain L>R for last couple of days. Pt has a hx of CVA with left side weakness and pt is bed bound. Pt is taking coumadin and dose had been increased two days ago. Pt denies recent hx of trauma, headache, dizziness, blurred visions, light sensitivity, focal/distal weakness or numbness, cough, sob, chest pain, nausea, vomiting, fever, chills, abd pain, dysuria or irregular bowel movements. Pt anticoagulated on coumadin, ultrasound shows no DVT.    patient on coumadin, follow INR

## 2019-10-10 NOTE — DISCHARGE NOTE NURSING/CASE MANAGEMENT/SOCIAL WORK - PATIENT PORTAL LINK FT
You can access the FollowMyHealth Patient Portal offered by [facility] by registering at the following website: [email or web address]. By joining [facility]’s FollowMyHealth portal, you will also be able to view your health information using other applications (apps) compatible with our system.

## 2019-10-10 NOTE — DISCHARGE NOTE PROVIDER - CARE PROVIDER_API CALL
Kodak Petersen)  Obstetrics and Gynecology  [address] [place] [address]  Phone: [phone]  Fax: [phone]  Follow Up Time:

## 2019-10-10 NOTE — PROGRESS NOTE ADULT - PROVIDER SPECIALTY LIST ADULT
GYN
Heme/Onc
Hospitalist
Vascular Surgery
Hospitalist

## 2019-10-10 NOTE — DISCHARGE NOTE PROVIDER - HOSPITAL COURSE
68 year old female PMH CVA w left hemiparesis, HTN, HL brought by ems with daughter c/o bilateral legs swelling and pain L>R for last couple of days. Pt has a hx of CVA with left side weakness and pt is bed bound. Pt is taking coumadin and dose had been increased two days ago. Pt denies recent hx of trauma, headache, dizziness, blurred visions, light sensitivity, focal/distal weakness or numbness, cough, sob, chest pain, nausea, vomiting, fever, chills, abd pain, dysuria or irregular bowel movements. Pt anticoagulated on coumadin, ultrasound shows no DVT.            Leg pain, diffuse, left:    - Pain control, no DVT.     - DC to [facility],             H/o CVA:    - Left hemiparesis    - Pt on coumadin for CVA risk reduction, pt not aware of any A fib    - No need of bridging with heparin, coumadin per INR for now. c/w 3 mg qhs     - Advised pt to follow up with primary doctor regarding AC.            HTN:    - Lisinopril 10 mg po daily.                 Uterine mass:    - S/P I and D    - Will need to f//u in Dr. Petersen's office in 1- 2 weeks for biopsy report.

## 2019-10-10 NOTE — PROGRESS NOTE ADULT - REASON FOR ADMISSION
Leg pain.

## 2019-10-16 DIAGNOSIS — D50.9 IRON DEFICIENCY ANEMIA, UNSPECIFIED: ICD-10-CM

## 2019-10-16 DIAGNOSIS — E78.5 HYPERLIPIDEMIA, UNSPECIFIED: ICD-10-CM

## 2019-10-16 DIAGNOSIS — E83.39 OTHER DISORDERS OF PHOSPHORUS METABOLISM: ICD-10-CM

## 2019-10-16 DIAGNOSIS — Z98.2 PRESENCE OF CEREBROSPINAL FLUID DRAINAGE DEVICE: ICD-10-CM

## 2019-10-16 DIAGNOSIS — N84.0 POLYP OF CORPUS UTERI: ICD-10-CM

## 2019-10-16 DIAGNOSIS — D27.0 BENIGN NEOPLASM OF RIGHT OVARY: ICD-10-CM

## 2019-10-16 DIAGNOSIS — M79.605 PAIN IN LEFT LEG: ICD-10-CM

## 2019-10-16 DIAGNOSIS — I69.354 HEMIPLEGIA AND HEMIPARESIS FOLLOWING CEREBRAL INFARCTION AFFECTING LEFT NON-DOMINANT SIDE: ICD-10-CM

## 2019-10-16 DIAGNOSIS — D25.9 LEIOMYOMA OF UTERUS, UNSPECIFIED: ICD-10-CM

## 2019-10-16 DIAGNOSIS — I10 ESSENTIAL (PRIMARY) HYPERTENSION: ICD-10-CM

## 2019-10-16 DIAGNOSIS — E87.6 HYPOKALEMIA: ICD-10-CM

## 2019-10-16 DIAGNOSIS — Z79.01 LONG TERM (CURRENT) USE OF ANTICOAGULANTS: ICD-10-CM

## 2019-10-16 DIAGNOSIS — M62.82 RHABDOMYOLYSIS: ICD-10-CM

## 2019-10-16 DIAGNOSIS — N85.8 OTHER SPECIFIED NONINFLAMMATORY DISORDERS OF UTERUS: ICD-10-CM

## 2022-09-06 NOTE — ED ADULT TRIAGE NOTE - NS ED TRIAGE AVPU SCALE
Last office visit 6/22/2022  Last med refill 2/21/2022
Alert-The patient is alert, awake and responds to voice. The patient is oriented to time, place, and person. The triage nurse is able to obtain subjective information.

## 2024-01-09 NOTE — PROGRESS NOTE ADULT - PROBLEM SELECTOR PLAN 8
microcytic anemia  denies melena or bright red blood per rectum  continue iron suppl
microcytic anemia  denies melena or bright red blood per rectum  continue iron suppl
microcytic anemia  denies melena or brbpr
microcytic anemia  denies melena or brbpr
microcytic anemia  denies melena or brbpr  follow anemia panel
microcytic anemia  denies melena or brbpr  follow anemia panel
microcytic anemia  denies melena or bright red blood per rectum  continue iron suppl
DISPLAY PLAN FREE TEXT
microcytic anemia  denies melena or brbpr  follow anemia panel